# Patient Record
Sex: FEMALE | Race: WHITE | NOT HISPANIC OR LATINO | Employment: OTHER | URBAN - METROPOLITAN AREA
[De-identification: names, ages, dates, MRNs, and addresses within clinical notes are randomized per-mention and may not be internally consistent; named-entity substitution may affect disease eponyms.]

---

## 2017-01-26 RX ORDER — FUROSEMIDE 40 MG/1
TABLET ORAL
Qty: 135 TABLET | Refills: 0 | Status: SHIPPED | OUTPATIENT
Start: 2017-01-26 | End: 2017-04-24 | Stop reason: SDUPTHER

## 2017-03-09 RX ORDER — OMEPRAZOLE 20 MG/1
CAPSULE, DELAYED RELEASE ORAL
Qty: 90 CAPSULE | Refills: 0 | Status: SHIPPED | OUTPATIENT
Start: 2017-03-09 | End: 2017-06-07 | Stop reason: SDUPTHER

## 2017-03-29 ENCOUNTER — OFFICE VISIT (OUTPATIENT)
Dept: INTERNAL MEDICINE | Facility: CLINIC | Age: 82
End: 2017-03-29

## 2017-03-29 VITALS
DIASTOLIC BLOOD PRESSURE: 78 MMHG | SYSTOLIC BLOOD PRESSURE: 138 MMHG | WEIGHT: 136 LBS | HEIGHT: 60 IN | BODY MASS INDEX: 26.7 KG/M2 | TEMPERATURE: 98.3 F | OXYGEN SATURATION: 93 % | HEART RATE: 72 BPM

## 2017-03-29 DIAGNOSIS — R29.818 ABNORMAL ROMBERG TEST: ICD-10-CM

## 2017-03-29 DIAGNOSIS — K21.9 GASTROESOPHAGEAL REFLUX DISEASE WITHOUT ESOPHAGITIS: ICD-10-CM

## 2017-03-29 DIAGNOSIS — E78.49 OTHER HYPERLIPIDEMIA: ICD-10-CM

## 2017-03-29 DIAGNOSIS — F51.01 PRIMARY INSOMNIA: ICD-10-CM

## 2017-03-29 DIAGNOSIS — I27.20 PULMONARY HYPERTENSION (HCC): ICD-10-CM

## 2017-03-29 DIAGNOSIS — Z00.00 MEDICARE ANNUAL WELLNESS VISIT, INITIAL: Primary | ICD-10-CM

## 2017-03-29 DIAGNOSIS — N18.30 CHRONIC KIDNEY DISEASE, STAGE III (MODERATE) (HCC): ICD-10-CM

## 2017-03-29 DIAGNOSIS — M19.041 PRIMARY OSTEOARTHRITIS OF RIGHT HAND: ICD-10-CM

## 2017-03-29 DIAGNOSIS — D64.9 ANEMIA, UNSPECIFIED TYPE: ICD-10-CM

## 2017-03-29 DIAGNOSIS — I10 ESSENTIAL HYPERTENSION: ICD-10-CM

## 2017-03-29 DIAGNOSIS — E03.8 OTHER SPECIFIED HYPOTHYROIDISM: ICD-10-CM

## 2017-03-29 PROCEDURE — G0438 PPPS, INITIAL VISIT: HCPCS | Performed by: INTERNAL MEDICINE

## 2017-03-29 PROCEDURE — 99214 OFFICE O/P EST MOD 30 MIN: CPT | Performed by: INTERNAL MEDICINE

## 2017-03-29 PROCEDURE — G0009 ADMIN PNEUMOCOCCAL VACCINE: HCPCS | Performed by: INTERNAL MEDICINE

## 2017-03-29 PROCEDURE — 90670 PCV13 VACCINE IM: CPT | Performed by: INTERNAL MEDICINE

## 2017-03-29 NOTE — PROGRESS NOTES
QUICK REFERENCE INFORMATION:  The ABCs of the Annual Wellness Visit    Initial Medicare Wellness Visit    HEALTH RISK ASSESSMENT    6/17/1926    Recent Hospitalizations:  Recently treated at the following:  Other: [facility]        Current Medical Providers:  Patient Care Team:  Gurpreet Maya MD as PCP - General  Gurpreet Maya MD as PCP - Family Medicine        Smoking Status:  History   Smoking Status   • Former Smoker   Smokeless Tobacco   • Not on file       Alcohol Consumption:  History   Alcohol Use No       Depression Screen:   PHQ-9 Depression Screening 3/29/2017   Little interest or pleasure in doing things 0   Feeling down, depressed, or hopeless 3   Trouble falling or staying asleep, or sleeping too much 0   Feeling tired or having little energy 3   Poor appetite or overeating 0   Feeling bad about yourself - or that you are a failure or have let yourself or your family down 0   Trouble concentrating on things, such as reading the newspaper or watching television 0   Moving or speaking so slowly that other people could have noticed. Or the opposite - being so fidgety or restless that you have been moving around a lot more than usual 0   Thoughts that you would be better off dead, or of hurting yourself in some way 0   PHQ-9 Total Score 6   If you checked off any problems, how difficult have these problems made it for you to do your work, take care of things at home, or get along with other people? Not difficult at all       Health Habits and Functional and Cognitive Screening:  Functional & Cognitive Status 3/29/2017   Do you have difficulty preparing food and eating? No   Do you have difficulty bathing yourself? No   Do you have difficulty getting dressed? No   Do you have difficulty using the toilet? No   Do you have difficulty moving around from place to place? No   In the past year have you fallen or experienced a near fall? Yes   Do you need help using the phone?  No   Are you deaf or  do you have serious difficulty hearing?  No   Do you need help with transportation? No   Do you need help shopping? No   Do you need help preparing meals?  No   Do you need help with housework?  No   Do you need help with laundry? No   Do you need help taking your medications? No   Do you need help managing money? No       Health Habits  Current Diet: Well Balanced Diet  Dental Exam: Up to date  Eye Exam: Up to date  Exercise (times per week): 0 times per week  Current Exercise Activities Include: None          Does the patient have evidence of cognitive impairment? No    Asprin use counseling:yes      Recent Lab Results:    Visual Acuity:  No exam data present    Age-appropriate Screening Schedule:  Refer to the list below for future screening recommendations based on patient's age, sex and/or medical conditions. Orders for these recommended tests are listed in the plan section. The patient has been provided with a written plan.    Health Maintenance   Topic Date Due   • TDAP/TD VACCINES (1 - Tdap) 06/17/1945   • MAMMOGRAM  03/28/2016   • LIPID PANEL  09/28/2016   • PNEUMOCOCCAL VACCINES (65+ LOW/MEDIUM RISK) (2 of 2 - PPSV23) 10/26/2016   • INFLUENZA VACCINE  Completed   • ZOSTER VACCINE  Addressed        Subjective   History of Present Illness    Nehemias Lizama is a 89 y.o. female who presents for an Annual Wellness Visit.    The following portions of the patient's history were reviewed and updated as appropriate: allergies, current medications, past family history, past medical history, past social history, past surgical history and problem list.    Outpatient Medications Prior to Visit   Medication Sig Dispense Refill   • amiodarone (PACERONE) 200 MG tablet Take  by mouth.     • Artificial Saliva (MOUTH KOTE MT) Mouth Kote Mouth/Throat Solution; Patient Sig: Mouth Kote Mouth/Throat Solution USE AS DIRECTED AS A SALIVA SUB[facility]; 12; 0; 01-May-2013; Active     • bosentan (TRACLEER) 125 MG tablet Take  by mouth  2 (two) times a day.     • digoxin (LANOXIN) 125 MCG tablet TAKE 1 TABLET DAILY 90 tablet 1   • furosemide (LASIX) 40 MG tablet TAKE ONE AND ONE-HALF TABLETS DAILY 135 tablet 0   • hydrALAZINE (APRESOLINE) 50 MG tablet TAKE 1 TABLET THREE TIMES A  tablet 1   • HYDROcodone-acetaminophen (VICODIN) 5-500 MG per tablet Take  by mouth.     • Iloprost 20 MCG/ML solution Earnest MATTHEWS; Patient Sig: Earnest MATTHEWS ; 0; 04-Sep-2013; Active     • levothyroxine (SYNTHROID, LEVOTHROID) 88 MCG tablet TAKE 1 TABLET DAILY 90 tablet 1   • mirtazapine (REMERON) 30 MG tablet Take  by mouth.     • montelukast (SINGULAIR) 10 MG tablet Take  by mouth daily.     • omeprazole (priLOSEC) 20 MG capsule TAKE 1 CAPSULE DAILY 90 capsule 0   • PARoxetine (PAXIL) 40 MG tablet TAKE 1 TABLET DAILY 90 tablet 1   • potassium chloride (K-DUR,KLOR-CON) 20 MEQ CR tablet TAKE 1 TABLET DAILY 90 tablet 1   • quinapril (ACCUPRIL) 10 MG tablet Take  by mouth.       No facility-administered medications prior to visit.        Patient Active Problem List   Diagnosis   • Abdominal bloating   • Left lower quadrant pain   • Left upper quadrant pain   • Allergic rhinitis   • Anemia   • Ankle joint pain   • Atrial fibrillation   • Chronic kidney disease, stage III (moderate)   • Congestive heart failure   • Carpal tunnel syndrome   • Depression   • Diarrhea   • Diverticulosis of large intestine   • Gastroesophageal reflux disease   • Essential hypertension   • Excessive flatus   • Fatigue   • Abdominal wall hernia   • Hyperlipidemia   • Hypertension   • Hypokalemia   • Hypothyroidism   • Intestinal obstruction   • Impacted cerumen   • Insomnia   • Iron deficiency   • Pain in extremity   • Swelling of limb   • Osteoarthritis of hand   • Osteoarthritis of knee   • Pulmonary hypertension   • Urinary tract infection   • Vitamin D deficiency   • Aptyalism   • Left foot pain       Advanced Care Planning:  has an advanced directive - a copy has been provided and is  "in file    Identification of Risk Factors:  Risk factors include: increased fall risk.    Review of Systems    Compared to one year ago, the patient feels her physical health is the same.  Compared to one year ago, the patient feels her mental health is the same.    Objective     Physical Exam    Vitals:    03/29/17 1210   BP: (!) 186/55   BP Location: Left arm   Patient Position: Sitting   Cuff Size: Adult   Pulse: 72   Temp: 98.3 °F (36.8 °C)   TempSrc: Tympanic   SpO2: 93%   Weight: 136 lb (61.7 kg)   Height: 60\" (152.4 cm)   PainSc:   6       Body mass index is 26.56 kg/(m^2).  Discussed the patient's BMI with her. The BMI is in the acceptable range.    Assessment/Plan   Patient Self-Management and Personalized Health Advice  The patient has been provided with information about: fall prevention and preventive services including:   · Fall Risk assessment done, Fall Risk plan of care done.    Visit Diagnoses:  No diagnosis found.    No orders of the defined types were placed in this encounter.      Outpatient Encounter Prescriptions as of 3/29/2017   Medication Sig Dispense Refill   • amiodarone (PACERONE) 200 MG tablet Take  by mouth.     • Artificial Saliva (MOUTH KOTE MT) Mouth Kote Mouth/Throat Solution; Patient Sig: Mouth Kote Mouth/Throat Solution USE AS DIRECTED AS A [facility]; 12; 0; 01-May-2013; Active     • bosentan (TRACLEER) 125 MG tablet Take  by mouth 2 (two) times a day.     • digoxin (LANOXIN) 125 MCG tablet TAKE 1 TABLET DAILY 90 tablet 1   • furosemide (LASIX) 40 MG tablet TAKE ONE AND ONE-HALF TABLETS DAILY 135 tablet 0   • hydrALAZINE (APRESOLINE) 50 MG tablet TAKE 1 TABLET THREE TIMES A  tablet 1   • HYDROcodone-acetaminophen (VICODIN) 5-500 MG per tablet Take  by mouth.     • Iloprost 20 MCG/ML solution Ventavis SOLN; Patient Sig: Ventavis SOLN ; 0; 04-Sep-2013; Active     • levothyroxine (SYNTHROID, LEVOTHROID) 88 MCG tablet TAKE 1 TABLET DAILY 90 tablet 1   • mirtazapine " (REMERON) 30 MG tablet Take  by mouth.     • montelukast (SINGULAIR) 10 MG tablet Take  by mouth daily.     • omeprazole (priLOSEC) 20 MG capsule TAKE 1 CAPSULE DAILY 90 capsule 0   • PARoxetine (PAXIL) 40 MG tablet TAKE 1 TABLET DAILY 90 tablet 1   • potassium chloride (K-DUR,KLOR-CON) 20 MEQ CR tablet TAKE 1 TABLET DAILY 90 tablet 1   • quinapril (ACCUPRIL) 10 MG tablet Take  by mouth.       No facility-administered encounter medications on file as of 3/29/2017.        Reviewed use of high risk medication in the elderly: yes  Reviewed for potential of harmful drug interactions in the elderly: yes    Follow Up:  No Follow-up on file.     An After Visit Summary and PPPS with all of these plans were given to the patient.

## 2017-03-29 NOTE — PATIENT INSTRUCTIONS
Medicare Wellness  Personal Prevention Plan of Service     Date of Office Visit:  2017  Encounter Provider:  Gurpreet Maya MD  Place of Service:  [facility] INTER[facility] MED[facility]  Patient Name: Nehemias Lizama  :  1926    As part of the Medicare Wellness portion of your visit today, we are providing you with this personalized preventive plan of services (PPPS). This plan is based upon recommendations of the [place] Preventive Services Task Force (USPSTF) and the Advisory Committee on Immunization Practices (ACIP).    This lists the preventive care services that should be considered, and provides dates of when you are due. Items listed as completed are up-to-date and do not require any further intervention.    Health Maintenance   Topic Date Due   • TDAP/TD VACCINES (1 - Tdap) 1945   • MEDICARE ANNUAL WELLNESS  2016   • MAMMOGRAM  2016   • LIPID PANEL  2016   • PNEUMOCOCCAL VACCINES (65+ LOW/MEDIUM RISK) (2 of 2 - PPSV23) 10/26/2016   • INFLUENZA VACCINE  Completed   • ZOSTER VACCINE  Addressed       No orders of the defined types were placed in this encounter.      Return in about 6 months (around 2017).

## 2017-03-30 NOTE — PROGRESS NOTES
Subjective   Nehemias Lizama is a 89 y.o. female.   She is here today for Medicare annual wellness visit initial along with hyper lipidemia hypertension pulmonary hypertension GERD without esophagitis hypothyroid as an chronic kidney disease stage III osteoarthritis of right hand anemia insomnia and abnormal Romberg test  History of Present Illness   She is here today for Medicare annual wellness visit initial along with hyper lipidemia hypertension pulmonary hypertension GERD without esophagitis hypothyroidism chronic kidney disease stage III osteoarthritis of right hand anemia insomnia and abnormal Romberg test  The following portions of the patient's history were reviewed and updated as appropriate: allergies, current medications, past family history, past medical history, past social history, past surgical history and problem list.    Review of Systems   All other systems reviewed and are negative.      Objective   Physical Exam   Constitutional: She is oriented to person, place, and time. Vital signs are normal. She appears well-developed and well-nourished. She is active.   HENT:   Head: Normocephalic and atraumatic.   Right Ear: Hearing, tympanic membrane, external ear and ear canal normal.   Left Ear: Hearing, tympanic membrane, external ear and ear canal normal.   Nose: Nose normal.   Mouth/Throat: Uvula is midline, oropharynx is clear and moist and mucous membranes are normal.   Eyes: Conjunctivae, EOM and lids are normal. Pupils are equal, round, and reactive to light. Right eye exhibits no discharge. Left eye exhibits no discharge.   Neck: Trachea normal, normal range of motion, full passive range of motion without pain and phonation normal. Neck supple. Carotid bruit is not present. No edema present. No thyroid mass and no thyromegaly present.   Cardiovascular: Normal rate, regular rhythm, normal heart sounds, intact distal pulses and normal pulses.  Exam reveals no gallop and no friction rub.    No  murmur heard.  Pulmonary/Chest: Effort normal and breath sounds normal. No respiratory distress. She has no wheezes. She has no rales.   Abdominal: Soft. Normal appearance, normal aorta and bowel sounds are normal. She exhibits no distension, no abdominal bruit and no mass. There is no hepatosplenomegaly. There is no tenderness. There is no rebound, no guarding and no CVA tenderness. No hernia. Hernia confirmed negative in the right inguinal area and confirmed negative in the left inguinal area.   Musculoskeletal: Normal range of motion. She exhibits no edema or tenderness.       Vascular Status -  Her exam exhibits right foot vasculature normal. Her exam exhibits no right foot edema. Her exam exhibits left foot vasculature normal. Her exam exhibits no left foot edema.   Skin Integrity  -  Her right foot skin is intact.     Voleen 's left foot skin is intact. .  Lymphadenopathy:     She has no cervical adenopathy.     She has no axillary adenopathy.        Right: No inguinal and no supraclavicular adenopathy present.        Left: No inguinal and no supraclavicular adenopathy present.   Neurological: She is alert and oriented to person, place, and time. She has normal strength. No cranial nerve deficit or sensory deficit. She exhibits normal muscle tone. Coordination and gait normal.   Normal gait yet positive Romberg test   Skin: Skin is warm, dry and intact. No cyanosis. Nails show no clubbing.   Psychiatric: She has a normal mood and affect. Her speech is normal and behavior is normal. Judgment and thought content normal. Cognition and memory are normal.   Nursing note and vitals reviewed.      Assessment/Plan   Diagnoses and all orders for this visit:    Medicare annual wellness visit, initial  -     Cancel: Lipid Panel With LDL / HDL Ratio  -     Cancel: T4, Free  -     Cancel: TSH    Other hyperlipidemia  -     Cancel: Lipid Panel With LDL / HDL Ratio  -     Cancel: T4, Free  -     Cancel: TSH    Essential  hypertension  -     Cancel: Lipid Panel With LDL / HDL Ratio  -     Cancel: T4, Free  -     Cancel: TSH    Pulmonary hypertension  -     Cancel: Lipid Panel With LDL / HDL Ratio  -     Cancel: T4, Free  -     Cancel: TSH    Gastroesophageal reflux disease without esophagitis  -     Cancel: Lipid Panel With LDL / HDL Ratio  -     Cancel: T4, Free  -     Cancel: TSH    Other specified hypothyroidism  -     Cancel: Lipid Panel With LDL / HDL Ratio  -     Cancel: T4, Free  -     Cancel: TSH    Chronic kidney disease, stage III (moderate)  -     Cancel: Lipid Panel With LDL / HDL Ratio  -     Cancel: T4, Free  -     Cancel: TSH    Primary osteoarthritis of right hand  -     Cancel: Lipid Panel With LDL / HDL Ratio  -     Cancel: T4, Free  -     Cancel: TSH    Anemia, unspecified type  -     Cancel: Lipid Panel With LDL / HDL Ratio  -     Cancel: T4, Free  -     Cancel: TSH    Primary insomnia  -     Cancel: Lipid Panel With LDL / HDL Ratio  -     Cancel: T4, Free  -     Cancel: TSH    Abnormal Romberg test    Other orders  -     Pneumococcal Conjugate Vaccine 13-Valent All      Medicare annual wellness visit initial following recommendations  Chronic kidney disease stage III follow closely  Osteoporosis of right hand no easy answers  Anemia follow labs  Insomnia supportive meds proper sleep hygiene  Abnormal Romberg test no easy answers at her age  Hypothyroid as a follow TSH free T4  GERD stable on current medication  Hypertension well controlled  Pulmonary hypertension well-controlled  Hyper lipidemia keep LDL less than 70 with proper diet exercise medication

## 2017-04-13 RX ORDER — POTASSIUM CHLORIDE 20 MEQ/1
TABLET, EXTENDED RELEASE ORAL
Qty: 90 TABLET | Refills: 0 | Status: SHIPPED | OUTPATIENT
Start: 2017-04-13 | End: 2017-07-12 | Stop reason: SDUPTHER

## 2017-04-13 RX ORDER — DIGOXIN 125 MCG
TABLET ORAL
Qty: 90 TABLET | Refills: 0 | OUTPATIENT
Start: 2017-04-13 | End: 2017-06-17

## 2017-04-24 RX ORDER — PAROXETINE HYDROCHLORIDE 40 MG/1
TABLET, FILM COATED ORAL
Qty: 90 TABLET | Refills: 0 | Status: SHIPPED | OUTPATIENT
Start: 2017-04-24 | End: 2017-07-23 | Stop reason: SDUPTHER

## 2017-04-24 RX ORDER — FUROSEMIDE 40 MG/1
TABLET ORAL
Qty: 135 TABLET | Refills: 1 | Status: SHIPPED | OUTPATIENT
Start: 2017-04-24 | End: 2017-07-05 | Stop reason: HOSPADM

## 2017-05-30 ENCOUNTER — APPOINTMENT (OUTPATIENT)
Dept: GENERAL RADIOLOGY | Facility: HOSPITAL | Age: 82
End: 2017-05-30

## 2017-05-30 ENCOUNTER — HOSPITAL ENCOUNTER (EMERGENCY)
Facility: HOSPITAL | Age: 82
Discharge: HOME | End: 2017-05-31
Attending: EMERGENCY MEDICINE | Admitting: EMERGENCY MEDICINE

## 2017-05-30 DIAGNOSIS — N18.30 CHRONIC KIDNEY DISEASE, STAGE III (MODERATE) (HCC): ICD-10-CM

## 2017-05-30 DIAGNOSIS — Z95.0 PACEMAKER: ICD-10-CM

## 2017-05-30 DIAGNOSIS — T46.0X1A: ICD-10-CM

## 2017-05-30 DIAGNOSIS — I50.9 ACUTE ON CHRONIC CONGESTIVE HEART FAILURE, UNSPECIFIED CONGESTIVE HEART FAILURE TYPE: Primary | ICD-10-CM

## 2017-05-30 LAB
ALBUMIN SERPL-MCNC: 3.9 G/DL (ref 3.5–5.2)
ALBUMIN/GLOB SERPL: 1.3 G/DL
ALP SERPL-CCNC: 81 U/L (ref 39–117)
ALT SERPL W P-5'-P-CCNC: 19 U/L (ref 1–33)
ANION GAP SERPL CALCULATED.3IONS-SCNC: 14.7 MMOL/L
AST SERPL-CCNC: 14 U/L (ref 1–32)
BASOPHILS # BLD AUTO: 0.03 10*3/MM3 (ref 0–0.2)
BASOPHILS NFR BLD AUTO: 0.2 % (ref 0–1.5)
BILIRUB SERPL-MCNC: 0.4 MG/DL (ref 0.1–1.2)
BUN BLD-MCNC: 31 MG/DL (ref 8–23)
BUN/CREAT SERPL: 17.2 (ref 7–25)
CALCIUM SPEC-SCNC: 9.9 MG/DL (ref 8.2–9.6)
CHLORIDE SERPL-SCNC: 103 MMOL/L (ref 98–107)
CO2 SERPL-SCNC: 23.3 MMOL/L (ref 22–29)
CREAT BLD-MCNC: 1.8 MG/DL (ref 0.57–1)
DEPRECATED RDW RBC AUTO: 53.9 FL (ref 37–54)
EOSINOPHIL # BLD AUTO: 0.07 10*3/MM3 (ref 0–0.7)
EOSINOPHIL NFR BLD AUTO: 0.6 % (ref 0.3–6.2)
ERYTHROCYTE [DISTWIDTH] IN BLOOD BY AUTOMATED COUNT: 15.5 % (ref 11.7–13)
GFR SERPL CREATININE-BSD FRML MDRD: 26 ML/MIN/1.73
GLOBULIN UR ELPH-MCNC: 3.1 GM/DL
GLUCOSE BLD-MCNC: 171 MG/DL (ref 65–99)
HCT VFR BLD AUTO: 27 % (ref 35.6–45.5)
HGB BLD-MCNC: 8.6 G/DL (ref 11.9–15.5)
IMM GRANULOCYTES # BLD: 0.06 10*3/MM3 (ref 0–0.03)
IMM GRANULOCYTES NFR BLD: 0.5 % (ref 0–0.5)
LYMPHOCYTES # BLD AUTO: 1.06 10*3/MM3 (ref 0.9–4.8)
LYMPHOCYTES NFR BLD AUTO: 8.4 % (ref 19.6–45.3)
MCH RBC QN AUTO: 30.2 PG (ref 26.9–32)
MCHC RBC AUTO-ENTMCNC: 31.9 G/DL (ref 32.4–36.3)
MCV RBC AUTO: 94.7 FL (ref 80.5–98.2)
MONOCYTES # BLD AUTO: 0.92 10*3/MM3 (ref 0.2–1.2)
MONOCYTES NFR BLD AUTO: 7.3 % (ref 5–12)
NEUTROPHILS # BLD AUTO: 10.47 10*3/MM3 (ref 1.9–8.1)
NEUTROPHILS NFR BLD AUTO: 83 % (ref 42.7–76)
NT-PROBNP SERPL-MCNC: 1505 PG/ML (ref 0–1800)
PLATELET # BLD AUTO: 185 10*3/MM3 (ref 140–500)
PMV BLD AUTO: 10.6 FL (ref 6–12)
POTASSIUM BLD-SCNC: 4.3 MMOL/L (ref 3.5–5.2)
PROT SERPL-MCNC: 7 G/DL (ref 6–8.5)
RBC # BLD AUTO: 2.85 10*6/MM3 (ref 3.9–5.2)
SODIUM BLD-SCNC: 141 MMOL/L (ref 136–145)
TROPONIN T SERPL-MCNC: <0.01 NG/ML (ref 0–0.03)
WBC NRBC COR # BLD: 12.61 10*3/MM3 (ref 4.5–10.7)

## 2017-05-30 PROCEDURE — 80053 COMPREHEN METABOLIC PANEL: CPT | Performed by: EMERGENCY MEDICINE

## 2017-05-30 PROCEDURE — 93010 ELECTROCARDIOGRAM REPORT: CPT | Performed by: INTERNAL MEDICINE

## 2017-05-30 PROCEDURE — 84145 PROCALCITONIN (PCT): CPT | Performed by: EMERGENCY MEDICINE

## 2017-05-30 PROCEDURE — 93005 ELECTROCARDIOGRAM TRACING: CPT

## 2017-05-30 PROCEDURE — 71020 HC CHEST PA AND LATERAL: CPT

## 2017-05-30 PROCEDURE — 83880 ASSAY OF NATRIURETIC PEPTIDE: CPT | Performed by: EMERGENCY MEDICINE

## 2017-05-30 PROCEDURE — 84484 ASSAY OF TROPONIN QUANT: CPT | Performed by: EMERGENCY MEDICINE

## 2017-05-30 PROCEDURE — 99284 EMERGENCY DEPT VISIT MOD MDM: CPT

## 2017-05-30 PROCEDURE — 85025 COMPLETE CBC W/AUTO DIFF WBC: CPT | Performed by: EMERGENCY MEDICINE

## 2017-05-30 PROCEDURE — 80162 ASSAY OF DIGOXIN TOTAL: CPT | Performed by: EMERGENCY MEDICINE

## 2017-05-30 RX ORDER — ASPIRIN 81 MG/1
81 TABLET, CHEWABLE ORAL DAILY
COMMUNITY

## 2017-05-30 RX ORDER — SODIUM CHLORIDE 0.9 % (FLUSH) 0.9 %
10 SYRINGE (ML) INJECTION AS NEEDED
Status: DISCONTINUED | OUTPATIENT
Start: 2017-05-30 | End: 2017-05-31 | Stop reason: HOSPADM

## 2017-05-30 RX ORDER — FUROSEMIDE 10 MG/ML
20 INJECTION INTRAMUSCULAR; INTRAVENOUS ONCE
Status: COMPLETED | OUTPATIENT
Start: 2017-05-30 | End: 2017-05-31

## 2017-05-31 VITALS
TEMPERATURE: 98.2 F | WEIGHT: 135 LBS | OXYGEN SATURATION: 94 % | HEART RATE: 64 BPM | DIASTOLIC BLOOD PRESSURE: 72 MMHG | BODY MASS INDEX: 25.49 KG/M2 | HEIGHT: 61 IN | RESPIRATION RATE: 18 BRPM | SYSTOLIC BLOOD PRESSURE: 159 MMHG

## 2017-05-31 LAB
DIGOXIN SERPL-MCNC: 1.7 NG/ML (ref 0.6–1.2)
HOLD SPECIMEN: NORMAL
HOLD SPECIMEN: NORMAL
PROCALCITONIN SERPL-MCNC: 0.07 NG/ML (ref 0.1–0.25)
WHOLE BLOOD HOLD SPECIMEN: NORMAL
WHOLE BLOOD HOLD SPECIMEN: NORMAL

## 2017-05-31 PROCEDURE — 96374 THER/PROPH/DIAG INJ IV PUSH: CPT

## 2017-05-31 PROCEDURE — 25010000002 FUROSEMIDE PER 20 MG: Performed by: EMERGENCY MEDICINE

## 2017-05-31 RX ADMIN — FUROSEMIDE 20 MG: 10 INJECTION, SOLUTION INTRAMUSCULAR; INTRAVENOUS at 00:29

## 2017-06-06 ENCOUNTER — TELEPHONE (OUTPATIENT)
Dept: SOCIAL WORK | Facility: HOSPITAL | Age: 82
End: 2017-06-06

## 2017-06-06 ENCOUNTER — OFFICE VISIT (OUTPATIENT)
Dept: INTERNAL MEDICINE | Facility: CLINIC | Age: 82
End: 2017-06-06

## 2017-06-06 VITALS
SYSTOLIC BLOOD PRESSURE: 206 MMHG | BODY MASS INDEX: 25.11 KG/M2 | DIASTOLIC BLOOD PRESSURE: 62 MMHG | HEART RATE: 60 BPM | HEIGHT: 61 IN | WEIGHT: 133 LBS | TEMPERATURE: 98.4 F | OXYGEN SATURATION: 94 % | RESPIRATION RATE: 16 BRPM

## 2017-06-06 DIAGNOSIS — I10 ESSENTIAL HYPERTENSION: Primary | ICD-10-CM

## 2017-06-06 DIAGNOSIS — R06.09 DOE (DYSPNEA ON EXERTION): ICD-10-CM

## 2017-06-06 DIAGNOSIS — N18.30 CHRONIC KIDNEY DISEASE, STAGE III (MODERATE) (HCC): ICD-10-CM

## 2017-06-06 DIAGNOSIS — I50.40 COMBINED SYSTOLIC AND DIASTOLIC CONGESTIVE HEART FAILURE, UNSPECIFIED CONGESTIVE HEART FAILURE CHRONICITY: ICD-10-CM

## 2017-06-06 DIAGNOSIS — I27.20 PULMONARY HYPERTENSION (HCC): ICD-10-CM

## 2017-06-06 PROCEDURE — 99214 OFFICE O/P EST MOD 30 MIN: CPT | Performed by: INTERNAL MEDICINE

## 2017-06-06 NOTE — TELEPHONE ENCOUNTER
Spoke with pt today in f/u and she states that she did not get into see her cardiologist but she did speak with him on the phone and he cut her digoxin back to every other day and that she is feeling much better. She has an appointment to see Dr. Maya today. No other questions at this time. Margaret PLATA

## 2017-06-07 RX ORDER — OMEPRAZOLE 20 MG/1
CAPSULE, DELAYED RELEASE ORAL
Qty: 90 CAPSULE | Refills: 1 | Status: SHIPPED | OUTPATIENT
Start: 2017-06-07 | End: 2017-12-04 | Stop reason: SDUPTHER

## 2017-06-12 RX ORDER — HYDRALAZINE HYDROCHLORIDE 50 MG/1
TABLET, FILM COATED ORAL
Qty: 270 TABLET | Refills: 0 | Status: SHIPPED | OUTPATIENT
Start: 2017-06-12 | End: 2017-09-10 | Stop reason: SDUPTHER

## 2017-06-12 RX ORDER — LEVOTHYROXINE SODIUM 88 UG/1
TABLET ORAL
Qty: 90 TABLET | Refills: 0 | Status: SHIPPED | OUTPATIENT
Start: 2017-06-12 | End: 2017-09-10 | Stop reason: SDUPTHER

## 2017-06-15 ENCOUNTER — OFFICE VISIT (OUTPATIENT)
Dept: INTERNAL MEDICINE | Facility: CLINIC | Age: 82
End: 2017-06-15

## 2017-06-15 VITALS
HEART RATE: 60 BPM | OXYGEN SATURATION: 94 % | HEIGHT: 61 IN | BODY MASS INDEX: 25.3 KG/M2 | RESPIRATION RATE: 16 BRPM | WEIGHT: 134 LBS | TEMPERATURE: 98.4 F | SYSTOLIC BLOOD PRESSURE: 188 MMHG | DIASTOLIC BLOOD PRESSURE: 50 MMHG
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
## 2017-06-15 DIAGNOSIS — S09.8XXA BLUNT HEAD TRAUMA, INITIAL ENCOUNTER: ICD-10-CM
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
## 2017-06-15 DIAGNOSIS — I10 ESSENTIAL HYPERTENSION: ICD-10-CM

## 2017-06-15 PROCEDURE — 99213 OFFICE O/P EST LOW 20 MIN: CPT | Performed by: INTERNAL MEDICINE

## 2017-06-15 NOTE — PROGRESS NOTES
Subjective   Nehemias Lizama is a 89 y.o. female.   She is here today status post fall at home 6/11/17 and did not go to the ER and had blunt head trauma as well  History of Present Illness   She is here today for status post fall at home on 6/11/17 and did not go to the ER and had blunt head trauma as well and is also here for hypertension  The following portions of the patient's history were reviewed and updated as appropriate: allergies, current medications, past family history, past medical history, past social history, past surgical history and problem list.    Review of Systems   All other systems reviewed and are negative.      Objective   Physical Exam   Constitutional: She is oriented to person, place, and time. She appears well-developed and well-nourished. She is cooperative.   HENT:   Head: Normocephalic and atraumatic.   Right Ear: Hearing, tympanic membrane, external ear and ear canal normal.   Left Ear: Hearing, tympanic membrane, external ear and ear canal normal.   Nose: Nose normal.   Mouth/Throat: Uvula is midline, oropharynx is clear and moist and mucous membranes are normal.   Eyes: Conjunctivae, EOM and lids are normal. Pupils are equal, round, and reactive to light.   Neck: Phonation normal. Neck supple. Carotid bruit is not present.   Cardiovascular: Normal rate, regular rhythm and normal heart sounds.  Exam reveals no gallop and no friction rub.    No murmur heard.  Pulmonary/Chest: Effort normal and breath sounds normal. No respiratory distress.   Abdominal: Soft. Bowel sounds are normal. She exhibits no distension and no mass. There is no hepatosplenomegaly. There is no tenderness. There is no rebound and no guarding. No hernia.   Musculoskeletal: She exhibits no edema.   Neurological: She is alert and oriented to person, place, and time. Coordination and gait normal.   Skin: Skin is warm and dry.   Psychiatric: She has a normal mood and affect. Her speech is normal and behavior is normal.  Judgment and thought content normal.   Nursing note and vitals reviewed.      Assessment/Plan   Diagnoses and all orders for this visit:    Fall at home, initial encounter  -     CT Head Without Contrast    Blunt head trauma, initial encounter  -     CT Head Without Contrast    Essential hypertension      Fell at home 6/11/17      Follow-up home initial encounter from 6/11/17 we will get CT of head without contrast  Blunt head trauma initial encounter CT of head without contrast  Hypertension not well-controlled we will make adjustments to medication as needed

## 2017-06-16 ENCOUNTER — HOSPITAL ENCOUNTER (OUTPATIENT)
Dept: CT IMAGING | Facility: HOSPITAL | Age: 82
Discharge: HOME | End: 2017-06-16
Attending: INTERNAL MEDICINE | Admitting: INTERNAL MEDICINE

## 2017-06-16 PROCEDURE — 70450 CT HEAD/BRAIN W/O DYE: CPT

## 2017-06-20 ENCOUNTER — HOSPITAL ENCOUNTER (OUTPATIENT)
Dept: CARDIOLOGY | Facility: HOSPITAL | Age: 82
Discharge: HOME | End: 2017-06-20
Attending: INTERNAL MEDICINE | Admitting: INTERNAL MEDICINE

## 2017-06-20 VITALS — WEIGHT: 134 LBS | BODY MASS INDEX: 25.3 KG/M2 | HEIGHT: 61 IN

## 2017-06-20 LAB
BH CV ECHO MEAS - ACS: 1.6 CM
BH CV ECHO MEAS - AO MAX PG (FULL): 8 MMHG
BH CV ECHO MEAS - AO MAX PG: 14.3 MMHG
BH CV ECHO MEAS - AO MEAN PG (FULL): 5 MMHG
BH CV ECHO MEAS - AO MEAN PG: 8 MMHG
BH CV ECHO MEAS - AO ROOT AREA (BSA CORRECTED): 1.6
BH CV ECHO MEAS - AO ROOT AREA: 4.9 CM^2
BH CV ECHO MEAS - AO ROOT DIAM: 2.5 CM
BH CV ECHO MEAS - AO V2 MAX: 189 CM/SEC
BH CV ECHO MEAS - AO V2 MEAN: 125 CM/SEC
BH CV ECHO MEAS - AO V2 VTI: 37.4 CM
BH CV ECHO MEAS - AVA(I,A): 2.4 CM^2
BH CV ECHO MEAS - AVA(I,D): 2.4 CM^2
BH CV ECHO MEAS - AVA(V,A): 2.1 CM^2
BH CV ECHO MEAS - AVA(V,D): 2.1 CM^2
BH CV ECHO MEAS - BSA(HAYCOCK): 1.6 M^2
BH CV ECHO MEAS - BSA: 1.6 M^2
BH CV ECHO MEAS - BZI_BMI: 25.3 KILOGRAMS/M^2
BH CV ECHO MEAS - BZI_METRIC_HEIGHT: 154.9 CM
BH CV ECHO MEAS - BZI_METRIC_WEIGHT: 60.8 KG
BH CV ECHO MEAS - CONTRAST EF 4CH: 66.2 ML/M^2
BH CV ECHO MEAS - EDV(CUBED): 46.7 ML
BH CV ECHO MEAS - EDV(MOD-SP4): 65 ML
BH CV ECHO MEAS - EDV(TEICH): 54.4 ML
BH CV ECHO MEAS - EF(CUBED): 57.8 %
BH CV ECHO MEAS - EF(MOD-SP4): 66.2 %
BH CV ECHO MEAS - EF(TEICH): 50.4 %
BH CV ECHO MEAS - ESV(CUBED): 19.7 ML
BH CV ECHO MEAS - ESV(MOD-SP4): 22 ML
BH CV ECHO MEAS - ESV(TEICH): 27 ML
BH CV ECHO MEAS - FS: 25 %
BH CV ECHO MEAS - IVS/LVPW: 0.85
BH CV ECHO MEAS - IVSD: 1.1 CM
BH CV ECHO MEAS - LA DIMENSION: 4.3 CM
BH CV ECHO MEAS - LA/AO: 1.7
BH CV ECHO MEAS - LAT PEAK E' VEL: 8.2 CM/SEC
BH CV ECHO MEAS - LV DIASTOLIC VOL/BSA (35-75): 40.8 ML/M^2
BH CV ECHO MEAS - LV MASS(C)D: 141.5 GRAMS
BH CV ECHO MEAS - LV MASS(C)DI: 88.8 GRAMS/M^2
BH CV ECHO MEAS - LV MAX PG: 6.3 MMHG
BH CV ECHO MEAS - LV MEAN PG: 3 MMHG
BH CV ECHO MEAS - LV SYSTOLIC VOL/BSA (12-30): 13.8 ML/M^2
BH CV ECHO MEAS - LV V1 MAX: 125 CM/SEC
BH CV ECHO MEAS - LV V1 MEAN: 85.2 CM/SEC
BH CV ECHO MEAS - LV V1 VTI: 29.1 CM
BH CV ECHO MEAS - LVIDD: 3.6 CM
BH CV ECHO MEAS - LVIDS: 2.7 CM
BH CV ECHO MEAS - LVLD AP4: 5.2 CM
BH CV ECHO MEAS - LVLS AP4: 4.9 CM
BH CV ECHO MEAS - LVOT AREA (M): 3.1 CM^2
BH CV ECHO MEAS - LVOT AREA: 3.1 CM^2
BH CV ECHO MEAS - LVOT DIAM: 2 CM
BH CV ECHO MEAS - LVPWD: 1.3 CM
BH CV ECHO MEAS - MED PEAK E' VEL: 9.5 CM/SEC
BH CV ECHO MEAS - MR MAX PG: 145.4 MMHG
BH CV ECHO MEAS - MR MAX VEL: 603 CM/SEC
BH CV ECHO MEAS - MV A DUR: 0.18 SEC
BH CV ECHO MEAS - MV A MAX VEL: 34.4 CM/SEC
BH CV ECHO MEAS - MV DEC SLOPE: 625 CM/SEC^2
BH CV ECHO MEAS - MV DEC TIME: 0.2 SEC
BH CV ECHO MEAS - MV E MAX VEL: 146 CM/SEC
BH CV ECHO MEAS - MV E/A: 4.2
BH CV ECHO MEAS - MV MAX PG: 8.9 MMHG
BH CV ECHO MEAS - MV MEAN PG: 2 MMHG
BH CV ECHO MEAS - MV P1/2T MAX VEL: 160 CM/SEC
BH CV ECHO MEAS - MV P1/2T: 75 MSEC
BH CV ECHO MEAS - MV V2 MAX: 149 CM/SEC
BH CV ECHO MEAS - MV V2 MEAN: 61.9 CM/SEC
BH CV ECHO MEAS - MV V2 VTI: 36.6 CM
BH CV ECHO MEAS - MVA P1/2T LCG: 1.4 CM^2
BH CV ECHO MEAS - MVA(P1/2T): 2.9 CM^2
BH CV ECHO MEAS - MVA(VTI): 2.5 CM^2
BH CV ECHO MEAS - PA MAX PG (FULL): 9.4 MMHG
BH CV ECHO MEAS - PA MAX PG: 10.2 MMHG
BH CV ECHO MEAS - PA V2 MAX: 160 CM/SEC
BH CV ECHO MEAS - PI END-D VEL: 33.9 CM/SEC
BH CV ECHO MEAS - PVA(V,A): 1.1 CM^2
BH CV ECHO MEAS - PVA(V,D): 1.1 CM^2
BH CV ECHO MEAS - QP/QS: 0.4
BH CV ECHO MEAS - RAP SYSTOLE: 10 MMHG
BH CV ECHO MEAS - RV MAX PG: 0.84 MMHG
BH CV ECHO MEAS - RV MEAN PG: 0 MMHG
BH CV ECHO MEAS - RV V1 MAX: 45.8 CM/SEC
BH CV ECHO MEAS - RV V1 MEAN: 32.7 CM/SEC
BH CV ECHO MEAS - RV V1 VTI: 9.6 CM
BH CV ECHO MEAS - RVDD: 2.6 CM
BH CV ECHO MEAS - RVOT AREA: 3.8 CM^2
BH CV ECHO MEAS - RVOT DIAM: 2.2 CM
BH CV ECHO MEAS - RVSP: 80.9 MMHG
BH CV ECHO MEAS - SI(AO): 115.2 ML/M^2
BH CV ECHO MEAS - SI(CUBED): 16.9 ML/M^2
BH CV ECHO MEAS - SI(LVOT): 57.4 ML/M^2
BH CV ECHO MEAS - SI(MOD-SP4): 27 ML/M^2
BH CV ECHO MEAS - SI(TEICH): 17.2 ML/M^2
BH CV ECHO MEAS - SV(AO): 183.6 ML
BH CV ECHO MEAS - SV(CUBED): 27 ML
BH CV ECHO MEAS - SV(LVOT): 91.4 ML
BH CV ECHO MEAS - SV(MOD-SP4): 43 ML
BH CV ECHO MEAS - SV(RVOT): 36.5 ML
BH CV ECHO MEAS - SV(TEICH): 27.4 ML
BH CV ECHO MEAS - TAPSE (>1.6): 1.4 CM2
BH CV ECHO MEAS - TR MAX VEL: 421 CM/SEC
BH CV XLRA - RV BASE: 3.5 CM
BH CV XLRA - RV LENGTH: 6.2 CM
BH CV XLRA - RV MID: 3.7 CM
BH CV XLRA - TDI S': 13.2 CM/SEC
LEFT ATRIUM VOLUME INDEX: 56.9 ML/M2
LV EF 2D ECHO EST: 65 %

## 2017-06-20 PROCEDURE — 0399T HC MYOCARDL STRAIN IMAG QUAN ASSMT PER SESS: CPT

## 2017-06-20 PROCEDURE — 93306 TTE W/DOPPLER COMPLETE: CPT | Performed by: INTERNAL MEDICINE

## 2017-06-20 PROCEDURE — 93306 TTE W/DOPPLER COMPLETE: CPT

## 2017-06-21 NOTE — PROGRESS NOTES
Subjective   Nehemias Lizama is a 89 y.o. female.   She is here today for hospital discharge follow-up for combined systolic and diastolic congestive heart failure along with dyspnea on exertion pulmonary hypertension essential hypertension and chronic kidney disease stage III  History of Present Illness   She is here today for hospital discharge follow-up for combined systolic and diastolic congestive heart failure along with dyspnea on exertion pulmonary hypertension essential hypertension and chronic kidney disease stage III  The following portions of the patient's history were reviewed and updated as appropriate: allergies, current medications, past family history, past medical history, past social history, past surgical history and problem list.    Review of Systems   Constitutional: Positive for fatigue.   Respiratory: Positive for shortness of breath (with exertion).    All other systems reviewed and are negative.      Objective   Physical Exam   Constitutional: She is oriented to person, place, and time. She appears well-developed and well-nourished. She is cooperative.   HENT:   Head: Normocephalic and atraumatic.   Right Ear: Hearing, tympanic membrane, external ear and ear canal normal.   Left Ear: Hearing, tympanic membrane, external ear and ear canal normal.   Nose: Nose normal.   Mouth/Throat: Uvula is midline, oropharynx is clear and moist and mucous membranes are normal.   Eyes: Conjunctivae, EOM and lids are normal. Pupils are equal, round, and reactive to light.   Neck: Phonation normal. Neck supple. Carotid bruit is not present.   Cardiovascular: Normal rate, regular rhythm and normal heart sounds.  Exam reveals no gallop and no friction rub.    No murmur heard.  Pulmonary/Chest: Effort normal and breath sounds normal. No respiratory distress.   Abdominal: Soft. Bowel sounds are normal. She exhibits no distension and no mass. There is no hepatosplenomegaly. There is no tenderness. There is no  rebound and no guarding. No hernia.   Musculoskeletal: She exhibits no edema.   Neurological: She is alert and oriented to person, place, and time. Coordination and gait normal.   Skin: Skin is warm and dry.   Psychiatric: She has a normal mood and affect. Her speech is normal and behavior is normal. Judgment and thought content normal.   Nursing note and vitals reviewed.      Assessment/Plan   Diagnoses and all orders for this visit:    Essential hypertension    Pulmonary hypertension  -     Adult Transthoracic Echo Complete    LINDER (dyspnea on exertion)  -     Adult Transthoracic Echo Complete    Combined systolic and diastolic congestive heart failure, unspecified congestive heart failure chronicity    Chronic kidney disease, stage III (moderate)  -     Adult Transthoracic Echo Complete        Hypertension normally much better controlled and we will make adjustments to medication as needed  Hypertension 2-D echo of heart  Dyspnea on exertion the same  Combined systolic diastolic congestive heart failure follow-up for diabetics as needed  Chronic kidney disease stage III follow closely  The problem with her blood pressure is her systolic is very high but the diastolic is very normal for drop her systolic enough we may drop this diastolic where she will not get good coronary artery flow

## 2017-07-02 ENCOUNTER — APPOINTMENT (OUTPATIENT)
Dept: GENERAL RADIOLOGY | Facility: HOSPITAL | Age: 82
End: 2017-07-02

## 2017-07-02 ENCOUNTER — HOSPITAL ENCOUNTER (OUTPATIENT)
Facility: HOSPITAL | Age: 82
Setting detail: OBSERVATION
Discharge: HOME | End: 2017-07-05
Attending: EMERGENCY MEDICINE | Admitting: INTERNAL MEDICINE

## 2017-07-02 DIAGNOSIS — I10 ESSENTIAL HYPERTENSION: ICD-10-CM

## 2017-07-02 DIAGNOSIS — R09.02 HYPOXIA: ICD-10-CM

## 2017-07-02 DIAGNOSIS — N18.3 CKD (CHRONIC KIDNEY DISEASE), STAGE 3 (MODERATE): ICD-10-CM

## 2017-07-02 DIAGNOSIS — R26.2 DIFFICULTY WALKING: ICD-10-CM

## 2017-07-02 DIAGNOSIS — I50.33 ACUTE ON CHRONIC DIASTOLIC CONGESTIVE HEART FAILURE (HCC): ICD-10-CM

## 2017-07-02 DIAGNOSIS — I50.43 ACUTE ON CHRONIC COMBINED SYSTOLIC AND DIASTOLIC CONGESTIVE HEART FAILURE (HCC): Primary | ICD-10-CM

## 2017-07-02 LAB
ALBUMIN SERPL-MCNC: 4.6 G/DL (ref 3.5–5.2)
ALBUMIN/GLOB SERPL: 1.6 G/DL
ALP SERPL-CCNC: 91 U/L (ref 39–117)
ALT SERPL W P-5'-P-CCNC: 27 U/L (ref 1–33)
ANION GAP SERPL CALCULATED.3IONS-SCNC: 16.8 MMOL/L
AST SERPL-CCNC: 26 U/L (ref 1–32)
BASOPHILS # BLD AUTO: 0.03 10*3/MM3 (ref 0–0.2)
BASOPHILS NFR BLD AUTO: 0.2 % (ref 0–1.5)
BILIRUB SERPL-MCNC: 0.4 MG/DL (ref 0.1–1.2)
BUN BLD-MCNC: 26 MG/DL (ref 8–23)
BUN/CREAT SERPL: 15.8 (ref 7–25)
CALCIUM SPEC-SCNC: 10.6 MG/DL (ref 8.2–9.6)
CHLORIDE SERPL-SCNC: 101 MMOL/L (ref 98–107)
CO2 SERPL-SCNC: 23.2 MMOL/L (ref 22–29)
CREAT BLD-MCNC: 1.65 MG/DL (ref 0.57–1)
DEPRECATED RDW RBC AUTO: 56.4 FL (ref 37–54)
DIGOXIN SERPL-MCNC: 0.6 NG/ML (ref 0.6–1.2)
EOSINOPHIL # BLD AUTO: 0.1 10*3/MM3 (ref 0–0.7)
EOSINOPHIL NFR BLD AUTO: 0.7 % (ref 0.3–6.2)
ERYTHROCYTE [DISTWIDTH] IN BLOOD BY AUTOMATED COUNT: 16.1 % (ref 11.7–13)
GFR SERPL CREATININE-BSD FRML MDRD: 29 ML/MIN/1.73
GLOBULIN UR ELPH-MCNC: 2.9 GM/DL
GLUCOSE BLD-MCNC: 146 MG/DL (ref 65–99)
HCT VFR BLD AUTO: 31.8 % (ref 35.6–45.5)
HGB BLD-MCNC: 9.8 G/DL (ref 11.9–15.5)
HOLD SPECIMEN: NORMAL
HOLD SPECIMEN: NORMAL
IMM GRANULOCYTES # BLD: 0.07 10*3/MM3 (ref 0–0.03)
IMM GRANULOCYTES NFR BLD: 0.5 % (ref 0–0.5)
LYMPHOCYTES # BLD AUTO: 1 10*3/MM3 (ref 0.9–4.8)
LYMPHOCYTES NFR BLD AUTO: 7.2 % (ref 19.6–45.3)
MCH RBC QN AUTO: 29.3 PG (ref 26.9–32)
MCHC RBC AUTO-ENTMCNC: 30.8 G/DL (ref 32.4–36.3)
MCV RBC AUTO: 94.9 FL (ref 80.5–98.2)
MONOCYTES # BLD AUTO: 1.31 10*3/MM3 (ref 0.2–1.2)
MONOCYTES NFR BLD AUTO: 9.5 % (ref 5–12)
NEUTROPHILS # BLD AUTO: 11.35 10*3/MM3 (ref 1.9–8.1)
NEUTROPHILS NFR BLD AUTO: 81.9 % (ref 42.7–76)
NT-PROBNP SERPL-MCNC: 1832 PG/ML (ref 0–1800)
PLATELET # BLD AUTO: 212 10*3/MM3 (ref 140–500)
PMV BLD AUTO: 10.7 FL (ref 6–12)
POTASSIUM BLD-SCNC: 4.3 MMOL/L (ref 3.5–5.2)
PROT SERPL-MCNC: 7.5 G/DL (ref 6–8.5)
RBC # BLD AUTO: 3.35 10*6/MM3 (ref 3.9–5.2)
SODIUM BLD-SCNC: 141 MMOL/L (ref 136–145)
TROPONIN T SERPL-MCNC: 0.01 NG/ML (ref 0–0.03)
WBC NRBC COR # BLD: 13.86 10*3/MM3 (ref 4.5–10.7)
WHOLE BLOOD HOLD SPECIMEN: NORMAL
WHOLE BLOOD HOLD SPECIMEN: NORMAL

## 2017-07-02 PROCEDURE — 80162 ASSAY OF DIGOXIN TOTAL: CPT | Performed by: EMERGENCY MEDICINE

## 2017-07-02 PROCEDURE — 96366 THER/PROPH/DIAG IV INF ADDON: CPT

## 2017-07-02 PROCEDURE — 85025 COMPLETE CBC W/AUTO DIFF WBC: CPT | Performed by: EMERGENCY MEDICINE

## 2017-07-02 PROCEDURE — 93005 ELECTROCARDIOGRAM TRACING: CPT

## 2017-07-02 PROCEDURE — 25010000002 FUROSEMIDE PER 20 MG: Performed by: EMERGENCY MEDICINE

## 2017-07-02 PROCEDURE — 25010000002 HEPARIN (PORCINE) PER 1000 UNITS: Performed by: INTERNAL MEDICINE

## 2017-07-02 PROCEDURE — G0378 HOSPITAL OBSERVATION PER HR: HCPCS

## 2017-07-02 PROCEDURE — 99284 EMERGENCY DEPT VISIT MOD MDM: CPT

## 2017-07-02 PROCEDURE — 93010 ELECTROCARDIOGRAM REPORT: CPT | Performed by: INTERNAL MEDICINE

## 2017-07-02 PROCEDURE — 80053 COMPREHEN METABOLIC PANEL: CPT | Performed by: EMERGENCY MEDICINE

## 2017-07-02 PROCEDURE — 25010000002 HYDRALAZINE PER 20 MG: Performed by: EMERGENCY MEDICINE

## 2017-07-02 PROCEDURE — 84484 ASSAY OF TROPONIN QUANT: CPT | Performed by: EMERGENCY MEDICINE

## 2017-07-02 PROCEDURE — 96365 THER/PROPH/DIAG IV INF INIT: CPT

## 2017-07-02 PROCEDURE — 71020 HC CHEST PA AND LATERAL: CPT

## 2017-07-02 PROCEDURE — 96372 THER/PROPH/DIAG INJ SC/IM: CPT

## 2017-07-02 PROCEDURE — 83880 ASSAY OF NATRIURETIC PEPTIDE: CPT | Performed by: EMERGENCY MEDICINE

## 2017-07-02 RX ORDER — POTASSIUM CHLORIDE 1.5 G/1.77G
20 POWDER, FOR SOLUTION ORAL DAILY
Status: DISCONTINUED | OUTPATIENT
Start: 2017-07-03 | End: 2017-07-02 | Stop reason: CLARIF

## 2017-07-02 RX ORDER — DIGOXIN 125 MCG
125 TABLET ORAL EVERY OTHER DAY
COMMUNITY
End: 2017-07-05 | Stop reason: HOSPADM

## 2017-07-02 RX ORDER — DIGOXIN 125 MCG
125 TABLET ORAL EVERY OTHER DAY
Status: DISCONTINUED | OUTPATIENT
Start: 2017-07-02 | End: 2017-07-05

## 2017-07-02 RX ORDER — QUINAPRIL 20 MG/1
20 TABLET ORAL 2 TIMES DAILY
Status: DISCONTINUED | OUTPATIENT
Start: 2017-07-02 | End: 2017-07-05

## 2017-07-02 RX ORDER — SODIUM CHLORIDE 0.9 % (FLUSH) 0.9 %
10 SYRINGE (ML) INJECTION AS NEEDED
Status: DISCONTINUED | OUTPATIENT
Start: 2017-07-02 | End: 2017-07-05 | Stop reason: HOSPADM

## 2017-07-02 RX ORDER — SODIUM CHLORIDE 0.9 % (FLUSH) 0.9 %
1-10 SYRINGE (ML) INJECTION AS NEEDED
Status: DISCONTINUED | OUTPATIENT
Start: 2017-07-02 | End: 2017-07-05 | Stop reason: HOSPADM

## 2017-07-02 RX ORDER — ASPIRIN 81 MG/1
81 TABLET, CHEWABLE ORAL DAILY
Status: DISCONTINUED | OUTPATIENT
Start: 2017-07-03 | End: 2017-07-05 | Stop reason: HOSPADM

## 2017-07-02 RX ORDER — HEPARIN SODIUM 5000 [USP'U]/ML
5000 INJECTION, SOLUTION INTRAVENOUS; SUBCUTANEOUS EVERY 12 HOURS SCHEDULED
Status: DISCONTINUED | OUTPATIENT
Start: 2017-07-02 | End: 2017-07-05 | Stop reason: HOSPADM

## 2017-07-02 RX ORDER — PANTOPRAZOLE SODIUM 40 MG/1
40 TABLET, DELAYED RELEASE ORAL
Status: DISCONTINUED | OUTPATIENT
Start: 2017-07-03 | End: 2017-07-05 | Stop reason: HOSPADM

## 2017-07-02 RX ORDER — HYDRALAZINE HYDROCHLORIDE 20 MG/ML
10 INJECTION INTRAMUSCULAR; INTRAVENOUS ONCE
Status: COMPLETED | OUTPATIENT
Start: 2017-07-02 | End: 2017-07-02

## 2017-07-02 RX ORDER — AMIODARONE HYDROCHLORIDE 200 MG/1
200 TABLET ORAL
Status: DISCONTINUED | OUTPATIENT
Start: 2017-07-03 | End: 2017-07-05 | Stop reason: HOSPADM

## 2017-07-02 RX ORDER — MELATONIN
2000 DAILY
COMMUNITY

## 2017-07-02 RX ORDER — POTASSIUM CHLORIDE 750 MG/1
20 CAPSULE, EXTENDED RELEASE ORAL DAILY
Status: DISCONTINUED | OUTPATIENT
Start: 2017-07-03 | End: 2017-07-05 | Stop reason: HOSPADM

## 2017-07-02 RX ORDER — HYDRALAZINE HYDROCHLORIDE 50 MG/1
50 TABLET, FILM COATED ORAL EVERY 12 HOURS SCHEDULED
Status: DISCONTINUED | OUTPATIENT
Start: 2017-07-02 | End: 2017-07-04

## 2017-07-02 RX ORDER — FUROSEMIDE 10 MG/ML
20 INJECTION INTRAMUSCULAR; INTRAVENOUS ONCE
Status: COMPLETED | OUTPATIENT
Start: 2017-07-02 | End: 2017-07-02

## 2017-07-02 RX ORDER — ONDANSETRON 2 MG/ML
4 INJECTION INTRAMUSCULAR; INTRAVENOUS EVERY 6 HOURS PRN
Status: DISCONTINUED | OUTPATIENT
Start: 2017-07-02 | End: 2017-07-05 | Stop reason: HOSPADM

## 2017-07-02 RX ORDER — BUMETANIDE 0.25 MG/ML
1 INJECTION INTRAMUSCULAR; INTRAVENOUS EVERY 12 HOURS
Status: DISCONTINUED | OUTPATIENT
Start: 2017-07-03 | End: 2017-07-04

## 2017-07-02 RX ORDER — ACETAMINOPHEN 325 MG/1
650 TABLET ORAL EVERY 4 HOURS PRN
Status: DISCONTINUED | OUTPATIENT
Start: 2017-07-02 | End: 2017-07-05 | Stop reason: HOSPADM

## 2017-07-02 RX ORDER — PAROXETINE HYDROCHLORIDE 40 MG/1
40 TABLET, FILM COATED ORAL DAILY
Status: DISCONTINUED | OUTPATIENT
Start: 2017-07-03 | End: 2017-07-05 | Stop reason: HOSPADM

## 2017-07-02 RX ORDER — MELATONIN
2000 DAILY
Status: DISCONTINUED | OUTPATIENT
Start: 2017-07-03 | End: 2017-07-05 | Stop reason: HOSPADM

## 2017-07-02 RX ORDER — LEVOTHYROXINE SODIUM 88 UG/1
88 TABLET ORAL
Status: DISCONTINUED | OUTPATIENT
Start: 2017-07-03 | End: 2017-07-05 | Stop reason: HOSPADM

## 2017-07-02 RX ADMIN — HEPARIN SODIUM 5000 UNITS: 5000 INJECTION, SOLUTION INTRAVENOUS; SUBCUTANEOUS at 23:54

## 2017-07-02 RX ADMIN — FUROSEMIDE 20 MG: 10 INJECTION, SOLUTION INTRAMUSCULAR; INTRAVENOUS at 18:37

## 2017-07-02 RX ADMIN — BUMETANIDE 0.5 MG/HR: 0.25 INJECTION INTRAMUSCULAR; INTRAVENOUS at 19:20

## 2017-07-02 RX ADMIN — QUINAPRIL HYDROCHLORIDE 20 MG: 20 TABLET, FILM COATED ORAL at 23:11

## 2017-07-02 RX ADMIN — ACETAMINOPHEN 650 MG: 325 TABLET ORAL at 23:54

## 2017-07-02 RX ADMIN — DIGOXIN 125 MCG: 125 TABLET ORAL at 23:11

## 2017-07-02 RX ADMIN — HYDRALAZINE HYDROCHLORIDE 50 MG: 50 TABLET, FILM COATED ORAL at 23:11

## 2017-07-02 RX ADMIN — HYDRALAZINE HYDROCHLORIDE 10 MG: 20 INJECTION INTRAMUSCULAR; INTRAVENOUS at 18:05

## 2017-07-02 NOTE — ED PROVIDER NOTES
"EMERGENCY DEPARTMENT ENCOUNTER       CHIEF COMPLAINT  Chief Complaint: Dyspnea  History given by: Patient, Family  History limited by: N/A  Room Number: 22/22  PMD: Gurpreet Maya MD      HPI:  HPI Comments: Pt presents to the ED c/o dyspnea onset about a month ago. Sx worsen with exertion and improve with rest. Pt has seen her PMD for this and had recent medication changes. Per family, pt's Hydralazine 50 mg was increased to three times a day, pt's Lasix was decreased to 40 mg three times weekly, and pt's Digoxin was decreased to three times weekly. Pt denies CP, abd pain, N/V/D, and palpitations, but reports that she has had \"pressure\" to the neck/head and mild lightheadedness. Pt reports that she typically uses a CPAP machine only at night, but for the past several days, pt has been using her CPAP machine in the daytime also due to the dyspnea. There are no other complaints at this time.     Dr. Sinha - cardiologist         Patient is a 89 y.o. female presenting with shortness of breath.   Shortness of Breath   Severity:  Moderate  Onset quality:  Gradual  Duration: onset about a month ago.  Timing:  Intermittent  Progression:  Worsening (gradually)  Context: not pollens    Relieved by:  Oxygen and rest (occasionally)  Worsened by:  Exertion  Associated symptoms: no abdominal pain, no chest pain, no cough, no rash, no sore throat and no vomiting          PAST MEDICAL HISTORY  Active Ambulatory Problems     Diagnosis Date Noted   • Abdominal bloating 03/28/2016   • Left lower quadrant pain 03/28/2016   • Left upper quadrant pain 03/28/2016   • Allergic rhinitis 03/28/2016   • Anemia 03/28/2016   • Ankle joint pain 03/28/2016   • Atrial fibrillation 03/28/2016   • Chronic kidney disease, stage III (moderate) 03/28/2016   • Congestive heart failure 03/28/2016   • Carpal tunnel syndrome 03/28/2016   • Depression 03/28/2016   • Diarrhea 03/28/2016   • Diverticulosis of large intestine 03/28/2016   • " Gastroesophageal reflux disease 03/28/2016   • Essential hypertension 03/28/2016   • Excessive flatus 03/28/2016   • Fatigue 03/28/2016   • Abdominal wall hernia 03/28/2016   • Hyperlipidemia 03/28/2016   • Hypertension 03/28/2016   • Hypokalemia 03/28/2016   • Hypothyroidism 03/28/2016   • Intestinal obstruction 03/28/2016   • Impacted cerumen 03/28/2016   • Insomnia 03/28/2016   • Iron deficiency 03/28/2016   • Pain in extremity 03/28/2016   • Swelling of limb 03/28/2016   • Osteoarthritis of hand 03/28/2016   • Osteoarthritis of knee 03/28/2016   • Pulmonary hypertension 03/28/2016   • Urinary tract infection 03/28/2016   • Vitamin D deficiency 03/28/2016   • 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   • Left foot pain 09/28/2016   • Medicare annual wellness visit, initial 03/29/2017   • Abnormal Romberg test 03/29/2017   • LINDER (dyspnea on exertion) 06/06/2017   • Fall at home 06/15/2017   • Blunt head trauma 06/15/2017     Resolved Ambulatory Problems     Diagnosis Date Noted   • No Resolved Ambulatory Problems     Past Medical History:   Diagnosis Date   • Anemia    • Arthritis    • CHF (congestive heart failure)    • Depression    • Diverticulosis    • GERD (gastroesophageal reflux disease)    • Hyperlipidemia    • Hypertension    • Hypothyroidism    • Neuromuscular disorder    • Urinary tract infection          PAST SURGICAL HISTORY  History reviewed. No pertinent surgical history.      FAMILY HISTORY  Family History   Problem Relation Age of Onset   • Coronary artery disease Other    • Hypertension Other    • Hyperlipidemia Other          SOCIAL HISTORY  Social History     Social History   • Marital status:      Spouse name: N/A   • Number of children: N/A   • Years of education: N/A     Occupational History   • Not on file.     Social History Main Topics   • Smoking status: Former Smoker   • Smokeless tobacco: Not on file   • Alcohol use No   • Drug use: Not on file   • Sexual activity: Not on file     Other Topics  "Concern   • Not on file     Social History Narrative         ALLERGIES  Amlodipine besy-benazepril hcl; Metoclopramide; and Nisoldipine        REVIEW OF SYSTEMS  Review of Systems   Constitutional: Negative for chills.   HENT: Negative for congestion, rhinorrhea and sore throat.    Eyes: Negative for pain.   Respiratory: Positive for shortness of breath. Negative for cough.    Cardiovascular: Negative for chest pain, palpitations and leg swelling.   Gastrointestinal: Negative for abdominal pain, diarrhea, nausea and vomiting.   Genitourinary: Negative for difficulty urinating, dysuria, flank pain and frequency.   Musculoskeletal: Negative for neck stiffness.        \"pressure\" to the head and neck   Skin: Negative for rash.   Neurological: Positive for light-headedness (mild). Negative for speech difficulty, weakness and numbness.   Psychiatric/Behavioral: Negative.    All other systems reviewed and are negative.           PHYSICAL EXAM  ED Triage Vitals   Temp Heart Rate Resp BP SpO2   07/02/17 1622 07/02/17 1622 07/02/17 1622 07/02/17 1626 07/02/17 1622   98.1 °F (36.7 °C) 60 16 198/68 90 %      Temp src Heart Rate Source Patient Position BP Location FiO2 (%)   07/02/17 1622 -- -- -- --   Tympanic           Physical Exam   Constitutional: She is oriented to person, place, and time. No distress.   HENT:   Head: Normocephalic.   Mouth/Throat: Mucous membranes are normal.   Eyes: EOM are normal. Pupils are equal, round, and reactive to light.   Neck: Normal range of motion. Neck supple.   Cardiovascular: Normal rate, regular rhythm and normal heart sounds.    Pulmonary/Chest: Effort normal. No respiratory distress. She has decreased breath sounds (bilaterally). She has no wheezes. She has no rhonchi. She has no rales.   Abdominal: Soft. There is no tenderness. There is no rebound and no guarding.   Musculoskeletal: Normal range of motion. She exhibits edema (mild to moderate BLE edema).   No calf tenderness " bilaterally.    Neurological: She is alert and oriented to person, place, and time. She has normal sensation.   Skin: Skin is warm and dry.   Psychiatric: Mood and affect normal.   Nursing note and vitals reviewed.          LAB RESULTS  Recent Results (from the past 24 hour(s))   Comprehensive Metabolic Panel    Collection Time: 07/02/17  5:07 PM   Result Value Ref Range    Glucose 146 (H) 65 - 99 mg/dL    BUN 26 (H) 8 - 23 mg/dL    Creatinine 1.65 (H) 0.57 - 1.00 mg/dL    Sodium 141 136 - 145 mmol/L    Potassium 4.3 3.5 - 5.2 mmol/L    Chloride 101 98 - 107 mmol/L    CO2 23.2 22.0 - 29.0 mmol/L    Calcium 10.6 (H) 8.2 - 9.6 mg/dL    Total Protein 7.5 6.0 - 8.5 g/dL    Albumin 4.60 3.50 - 5.20 g/dL    ALT (SGPT) 27 1 - 33 U/L    AST (SGOT) 26 1 - 32 U/L    Alkaline Phosphatase 91 39 - 117 U/L    Total Bilirubin 0.4 0.1 - 1.2 mg/dL    eGFR Non African Amer 29 (L) >60 mL/min/1.73    Globulin 2.9 gm/dL    A/G Ratio 1.6 g/dL    BUN/Creatinine Ratio 15.8 7.0 - 25.0    Anion Gap 16.8 mmol/L   BNP    Collection Time: 07/02/17  5:07 PM   Result Value Ref Range    proBNP 1832.0 (H) 0.0 - 1800.0 pg/mL   Troponin    Collection Time: 07/02/17  5:07 PM   Result Value Ref Range    Troponin T 0.011 0.000 - 0.030 ng/mL   Digoxin Level    Collection Time: 07/02/17  5:07 PM   Result Value Ref Range    Digoxin 0.60 0.60 - 1.20 ng/mL   CBC Auto Differential    Collection Time: 07/02/17  5:07 PM   Result Value Ref Range    WBC 13.86 (H) 4.50 - 10.70 10*3/mm3    RBC 3.35 (L) 3.90 - 5.20 10*6/mm3    Hemoglobin 9.8 (L) 11.9 - 15.5 g/dL    Hematocrit 31.8 (L) 35.6 - 45.5 %    MCV 94.9 80.5 - 98.2 fL    MCH 29.3 26.9 - 32.0 pg    MCHC 30.8 (L) 32.4 - 36.3 g/dL    RDW 16.1 (H) 11.7 - 13.0 %    RDW-SD 56.4 (H) 37.0 - 54.0 fl    MPV 10.7 6.0 - 12.0 fL    Platelets 212 140 - 500 10*3/mm3    Neutrophil % 81.9 (H) 42.7 - 76.0 %    Lymphocyte % 7.2 (L) 19.6 - 45.3 %    Monocyte % 9.5 5.0 - 12.0 %    Eosinophil % 0.7 0.3 - 6.2 %    Basophil % 0.2  0.0 - 1.5 %    Immature Grans % 0.5 0.0 - 0.5 %    Neutrophils, Absolute 11.35 (H) 1.90 - 8.10 10*3/mm3    Lymphocytes, Absolute 1.00 0.90 - 4.80 10*3/mm3    Monocytes, Absolute 1.31 (H) 0.20 - 1.20 10*3/mm3    Eosinophils, Absolute 0.10 0.00 - 0.70 10*3/mm3    Basophils, Absolute 0.03 0.00 - 0.20 10*3/mm3    Immature Grans, Absolute 0.07 (H) 0.00 - 0.03 10*3/mm3       Ordered the above labs and reviewed the results.        RADIOLOGY  XR Chest 2 View   Preliminary Result   Mild pulmonary vascular engorgement, edema, and small   pleural effusions.    Interpreted by radiologist. Independently viewed by me.                Ordered the above noted radiological studies. Reviewed by me in PACS.       PROCEDURES  Procedures    EKG:           EKG time: 16:50  Rhythm/Rate: Paced rhythm rate 70  QRS, axis: LAD       Interpreted Contemporaneously by me, independently viewed  unchanged compared to prior 05/30/17              PROGRESS AND CONSULTS  ED Course     5:56 PM: Per RN, pt's O2 sat on room air in the ER was 85%. Hydralazine 10 mg ordered to treat for elevated BP.     6:28 PM: Rechecked pt. Pt is resting comfortably and appears in no acute distress. Pt is currently on O2 nasal cannula. Informed pt and family that pt's BNP is 1832. CXR shows mild pulmonary edema and small pleural effusions. However, as pt is hypoxic with O2 sat of 85% on room air, need for admission to treat for hypoxia and CHF. Pt and family agree with plan. Decision time to admit: Now.     6:30 PM: Placed call to A for admission. Lasix 20 mg ordered to magali pt.     6:49 PM: Discussed case with Dr. Valles, hospitalist. He will admit pt to a telemetry bed. He would like pt started on a Bumex drip.     6:57 PM: Bumex drip ordered as d/w Dr. Valles.           MEDICAL DECISION MAKING      MDM  Number of Diagnoses or Management Options     Amount and/or Complexity of Data Reviewed  Clinical lab tests: reviewed and ordered (BNP is 1832. )  Tests in the  radiology section of CPT®: reviewed and ordered (CXR: There is a cardiac pacing device. The cardiac silhouette is  enlarged but unchanged. Pulmonary vasculature is mildly prominent and  there is hazy increased density at both lower lung zones and small  pleural effusions. No focal infiltrate is evident. There is no  pneumothorax. There is posttraumatic deformity of the left humeral head.)  Tests in the medicine section of CPT®: ordered and reviewed (EKG interpreted.   )  Decide to obtain previous medical records or to obtain history from someone other than the patient: yes  Review and summarize past medical records: yes (Pt's BNP was 1505 about a month ago. Pt's Hgb was 8.6 about a month ago. Pt's creatinine was 1.8 about a month ago. )  Discuss the patient with other providers: yes (Case d/w Dr. Valles, hospitalist, who will admit pt to a telemetry bed.   )    Patient Progress  Patient progress: stable             DIAGNOSIS  Final diagnoses:   Acute on chronic combined systolic and diastolic congestive heart failure   CKD (chronic kidney disease), stage 3 (moderate)   Hypoxia   Essential hypertension         DISPOSITION  Pt admitted to telemetry.      ADMISSION    Discussed treatment plan and reason for admission with pt/family and admitting physician.  Pt/family voiced understanding of the plan for admission for further testing/treatment as needed.               Latest Documented Vital Signs:  As of 6:55 PM  BP- (!) 190/70 HR- 60 Temp- 98.1 °F (36.7 °C) (Tympanic) O2 sat- 96%        --  Documentation assistance provided by mariela Aceves for Dr. Leanna MD.  Information recorded by the scribe was done at my direction and has been verified and validated by me.            Sudhir Aceves  07/02/17 1900       Moy Ram MD  07/02/17 3518

## 2017-07-02 NOTE — ED NOTES
PT. C/O SOA AND PRESSURE IN HER NECK AND HEAD. PT. ROOM AIR SAT WAS 85%.  PT. PLACED ON 2 L/M NC AND WENT UP 97%.     Felipe Watson RN  07/02/17 7718

## 2017-07-03 PROBLEM — Z66 DNR (DO NOT RESUSCITATE): Status: ACTIVE | Noted: 2017-07-03

## 2017-07-03 LAB
ALBUMIN SERPL-MCNC: 4 G/DL (ref 3.5–5.2)
ALBUMIN/GLOB SERPL: 1.5 G/DL
ALP SERPL-CCNC: 76 U/L (ref 39–117)
ALT SERPL W P-5'-P-CCNC: 17 U/L (ref 1–33)
ANION GAP SERPL CALCULATED.3IONS-SCNC: 15 MMOL/L
AST SERPL-CCNC: 19 U/L (ref 1–32)
BASOPHILS # BLD AUTO: 0.04 10*3/MM3 (ref 0–0.2)
BASOPHILS NFR BLD AUTO: 0.4 % (ref 0–1.5)
BILIRUB SERPL-MCNC: 0.5 MG/DL (ref 0.1–1.2)
BUN BLD-MCNC: 23 MG/DL (ref 8–23)
BUN/CREAT SERPL: 15.3 (ref 7–25)
CALCIUM SPEC-SCNC: 9.8 MG/DL (ref 8.2–9.6)
CHLORIDE SERPL-SCNC: 101 MMOL/L (ref 98–107)
CHOLEST SERPL-MCNC: 158 MG/DL (ref 0–200)
CO2 SERPL-SCNC: 26 MMOL/L (ref 22–29)
CREAT BLD-MCNC: 1.5 MG/DL (ref 0.57–1)
DEPRECATED RDW RBC AUTO: 55 FL (ref 37–54)
DIGOXIN SERPL-MCNC: 1.2 NG/ML (ref 0.6–1.2)
EOSINOPHIL # BLD AUTO: 0.14 10*3/MM3 (ref 0–0.7)
EOSINOPHIL NFR BLD AUTO: 1.5 % (ref 0.3–6.2)
ERYTHROCYTE [DISTWIDTH] IN BLOOD BY AUTOMATED COUNT: 16 % (ref 11.7–13)
GFR SERPL CREATININE-BSD FRML MDRD: 33 ML/MIN/1.73
GLOBULIN UR ELPH-MCNC: 2.6 GM/DL
GLUCOSE BLD-MCNC: 116 MG/DL (ref 65–99)
HCT VFR BLD AUTO: 28.7 % (ref 35.6–45.5)
HDLC SERPL-MCNC: 45 MG/DL (ref 40–60)
HGB BLD-MCNC: 8.9 G/DL (ref 11.9–15.5)
IMM GRANULOCYTES # BLD: 0.04 10*3/MM3 (ref 0–0.03)
IMM GRANULOCYTES NFR BLD: 0.4 % (ref 0–0.5)
INR PPP: 1.27 (ref 0.9–1.1)
LDLC SERPL CALC-MCNC: 92 MG/DL (ref 0–100)
LDLC/HDLC SERPL: 2.04 {RATIO}
LYMPHOCYTES # BLD AUTO: 1.22 10*3/MM3 (ref 0.9–4.8)
LYMPHOCYTES NFR BLD AUTO: 13.3 % (ref 19.6–45.3)
MCH RBC QN AUTO: 29.2 PG (ref 26.9–32)
MCHC RBC AUTO-ENTMCNC: 31 G/DL (ref 32.4–36.3)
MCV RBC AUTO: 94.1 FL (ref 80.5–98.2)
MONOCYTES # BLD AUTO: 0.89 10*3/MM3 (ref 0.2–1.2)
MONOCYTES NFR BLD AUTO: 9.7 % (ref 5–12)
NEUTROPHILS # BLD AUTO: 6.81 10*3/MM3 (ref 1.9–8.1)
NEUTROPHILS NFR BLD AUTO: 74.7 % (ref 42.7–76)
NT-PROBNP SERPL-MCNC: 2178 PG/ML (ref 0–1800)
PLATELET # BLD AUTO: 174 10*3/MM3 (ref 140–500)
PMV BLD AUTO: 10.8 FL (ref 6–12)
POTASSIUM BLD-SCNC: 3.5 MMOL/L (ref 3.5–5.2)
PROT SERPL-MCNC: 6.6 G/DL (ref 6–8.5)
PROTHROMBIN TIME: 15.5 SECONDS (ref 11.7–14.2)
RBC # BLD AUTO: 3.05 10*6/MM3 (ref 3.9–5.2)
SODIUM BLD-SCNC: 142 MMOL/L (ref 136–145)
TRIGL SERPL-MCNC: 107 MG/DL (ref 0–150)
TROPONIN T SERPL-MCNC: <0.01 NG/ML (ref 0–0.03)
TSH SERPL DL<=0.05 MIU/L-ACNC: 4.4 MIU/ML (ref 0.27–4.2)
VLDLC SERPL-MCNC: 21.4 MG/DL (ref 5–40)
WBC NRBC COR # BLD: 9.14 10*3/MM3 (ref 4.5–10.7)

## 2017-07-03 PROCEDURE — G0378 HOSPITAL OBSERVATION PER HR: HCPCS

## 2017-07-03 PROCEDURE — 83880 ASSAY OF NATRIURETIC PEPTIDE: CPT | Performed by: INTERNAL MEDICINE

## 2017-07-03 PROCEDURE — 96366 THER/PROPH/DIAG IV INF ADDON: CPT

## 2017-07-03 PROCEDURE — 84484 ASSAY OF TROPONIN QUANT: CPT | Performed by: INTERNAL MEDICINE

## 2017-07-03 PROCEDURE — 85610 PROTHROMBIN TIME: CPT | Performed by: INTERNAL MEDICINE

## 2017-07-03 PROCEDURE — 96376 TX/PRO/DX INJ SAME DRUG ADON: CPT

## 2017-07-03 PROCEDURE — 80061 LIPID PANEL: CPT | Performed by: INTERNAL MEDICINE

## 2017-07-03 PROCEDURE — 84443 ASSAY THYROID STIM HORMONE: CPT | Performed by: INTERNAL MEDICINE

## 2017-07-03 PROCEDURE — 80162 ASSAY OF DIGOXIN TOTAL: CPT | Performed by: INTERNAL MEDICINE

## 2017-07-03 PROCEDURE — 85025 COMPLETE CBC W/AUTO DIFF WBC: CPT | Performed by: INTERNAL MEDICINE

## 2017-07-03 PROCEDURE — 25010000002 HEPARIN (PORCINE) PER 1000 UNITS: Performed by: INTERNAL MEDICINE

## 2017-07-03 PROCEDURE — 99204 OFFICE O/P NEW MOD 45 MIN: CPT | Performed by: INTERNAL MEDICINE

## 2017-07-03 PROCEDURE — 96372 THER/PROPH/DIAG INJ SC/IM: CPT

## 2017-07-03 PROCEDURE — 80053 COMPREHEN METABOLIC PANEL: CPT | Performed by: INTERNAL MEDICINE

## 2017-07-03 RX ORDER — BOSENTAN 125 MG/1
125 TABLET, FILM COATED ORAL 2 TIMES DAILY
Status: DISCONTINUED | OUTPATIENT
Start: 2017-07-03 | End: 2017-07-05 | Stop reason: HOSPADM

## 2017-07-03 RX ADMIN — ASPIRIN 81 MG: 81 TABLET, CHEWABLE ORAL at 09:20

## 2017-07-03 RX ADMIN — HYDRALAZINE HYDROCHLORIDE 50 MG: 50 TABLET, FILM COATED ORAL at 09:20

## 2017-07-03 RX ADMIN — HYDRALAZINE HYDROCHLORIDE 50 MG: 50 TABLET, FILM COATED ORAL at 21:26

## 2017-07-03 RX ADMIN — BUMETANIDE 1 MG: 0.25 INJECTION, SOLUTION INTRAMUSCULAR; INTRAVENOUS at 02:18

## 2017-07-03 RX ADMIN — LEVOTHYROXINE SODIUM 88 MCG: 88 TABLET ORAL at 09:19

## 2017-07-03 RX ADMIN — PANTOPRAZOLE SODIUM 40 MG: 40 TABLET, DELAYED RELEASE ORAL at 05:46

## 2017-07-03 RX ADMIN — POTASSIUM CHLORIDE 20 MEQ: 750 CAPSULE, EXTENDED RELEASE ORAL at 09:20

## 2017-07-03 RX ADMIN — QUINAPRIL HYDROCHLORIDE 20 MG: 20 TABLET, FILM COATED ORAL at 09:20

## 2017-07-03 RX ADMIN — AMIODARONE HYDROCHLORIDE 200 MG: 200 TABLET ORAL at 09:20

## 2017-07-03 RX ADMIN — HEPARIN SODIUM 5000 UNITS: 5000 INJECTION, SOLUTION INTRAVENOUS; SUBCUTANEOUS at 09:19

## 2017-07-03 RX ADMIN — BOSENTAN 125 MG: 125 TABLET, FILM COATED ORAL at 18:57

## 2017-07-03 RX ADMIN — PAROXETINE HYDROCHLORIDE 40 MG: 40 TABLET, FILM COATED ORAL at 09:20

## 2017-07-03 RX ADMIN — BUMETANIDE 1 MG: 0.25 INJECTION, SOLUTION INTRAMUSCULAR; INTRAVENOUS at 17:15

## 2017-07-03 RX ADMIN — VITAMIN D, TAB 1000IU (100/BT) 2000 UNITS: 25 TAB at 09:20

## 2017-07-03 RX ADMIN — HEPARIN SODIUM 5000 UNITS: 5000 INJECTION, SOLUTION INTRAVENOUS; SUBCUTANEOUS at 21:26

## 2017-07-03 RX ADMIN — QUINAPRIL HYDROCHLORIDE 20 MG: 20 TABLET, FILM COATED ORAL at 18:57

## 2017-07-03 NOTE — CONSULTS
Patient Name: Nehemias Lizama  :[id number]  89 y.o.    Date of Admission: 2017  Date of Consultation:  17  Encounter Provider: Gael Lubin III, MD  Place of Service: [facility] CARDIOLOGY  Referring Provider: Artem Valles MD  Patient Care Team:  Gurpreet Maya MD as PCP - General  Guprreet Maya MD as PCP - Family Medicine      Chief complaint:  CHF exacerbation    History of Present Illness:      Patient is a 89 year old female who is normally followed by Dr. Sinha with [facility] for management of hypertension, paroxysmal atrial flutter, diastolic heart failure, and sick sinus syndrome (s/p PPM).      She presented to the emergency room yesterday with complaint of shortness of breath as well as a feeling of congestion within her head.  She states that it had been getting worse over the last 2 weeks.  She had she saw her nephrologist 2 weeks ago and her Lasix was decreased at that time; she states that ever since then she has had progressive worsening shortness of breath.  She has been experiencing orthopnea.  She has had for excisional nocturnal dyspnea.  She didn't having worsening dyspnea with activity.  No shortness of breath if she was just sitting still and sitting upright.  She has not had any chest pain, pressure, tightness, squeezing, or heartburn.  No nausea, vomiting, or diaphoresis.  She has not experienced any chills or fevers.  She's had a cough that is been nonproductive.      In the emergency room she was diagnosed with acute heart failure-her  CXR- mild pulmonary vascular engorgement, edema, and small pleural effusions.  She received 20mg IV Lasix and IV Hydralazine in the ER.  IV Bumex ordered.      She has not had any sensation of tachycardia or palpitations.  No presyncope or syncope.    Echo- 17  · Left ventricular wall thickness is consistent with mild concentric hypertrophy.  · Left atrial cavity size is moderately  dilated.  · Right ventricular cavity is mild-to-moderately dilated.  · Mild mitral valve regurgitation is present  · Moderate tricuspid valve regurgitation is present.  · Mild aortic valve stenosis is present.  · Mild mitral valve stenosis is present  · Left ventricular systolic function is normal. Estimated EF = 65%.  · Left ventricular diastolic dysfunction (grade III) consistent with reversible restrictive pattern.    Past Medical History:   Diagnosis Date   • Anemia    • Arthritis    • CHF (congestive heart failure)    • Depression    • Diverticulosis    • GERD (gastroesophageal reflux disease)    • Hyperlipidemia    • Hypertension    • Hypothyroidism    • Neuromuscular disorder    • Urinary tract infection        History reviewed. No pertinent surgical history.      Prior to Admission medications    Medication Sig Start Date End Date Taking? Authorizing Provider   amiodarone (PACERONE) 200 MG tablet Take  by mouth. 3/26/13  Yes Historical Provider, MD   aspirin 81 MG chewable tablet Chew 81 mg Daily.   Yes Historical Provider, MD   bosentan (TRACLEER) 125 MG tablet Take  by mouth 2 (two) times a day. 6/25/12  Yes Historical Provider, MD   cholecalciferol (VITAMIN D3) 1000 UNITS tablet Take 2,000 Units by mouth Daily.   Yes Historical Provider, MD   digoxin (LANOXIN) 125 MCG tablet Take 125 mcg by mouth Every Other Day.   Yes Historical Provider, MD   furosemide (LASIX) 40 MG tablet TAKE ONE AND ONE-HALF TABLETS DAILY  Patient taking differently: takes 1 tablet every other day 4/24/17  Yes Gurpreet Maya MD   hydrALAZINE (APRESOLINE) 50 MG tablet TAKE 1 TABLET THREE TIMES A DAY 6/12/17  Yes Gurpreet Maya MD   Iloprost 20 MCG/ML solution Ventavis SOLN; Patient Sig: Ventavis SOLN ; 0; 04-Sep-2013; Active 9/4/13  Yes Historical Provider, MD   levothyroxine (SYNTHROID, LEVOTHROID) 88 MCG tablet TAKE 1 TABLET DAILY  Patient taking differently: TAKE 100mg DAILY 6/12/17  Yes Gurpreet Maya MD   omeprazole  "(priLOSEC) 20 MG capsule TAKE 1 CAPSULE DAILY 6/7/17  Yes Gurpreet Maya MD   PARoxetine (PAXIL) 40 MG tablet TAKE 1 TABLET DAILY 4/24/17  Yes Gurpreet Maya MD   potassium chloride (K-DUR,KLOR-CON) 20 MEQ CR tablet TAKE 1 TABLET DAILY  Patient taking differently: pt takes 10meq daily 4/13/17  Yes Gurpreet Maya MD   quinapril (ACCUPRIL) 10 MG tablet Take 20 mg by mouth 2 (Two) Times a Day. 4/22/14  Yes Historical Provider, MD   Artificial Saliva (MOUTH KOTE MT) Mouth Kote Mouth/Throat Solution; Patient Sig: Mouth Kote Mouth/Throat Solution USE AS DIRECTED AS A SALIVA SUBSTITUTE; 12; 0; 01-May-2013; Active 5/1/13   Historical Provider, MD   mirtazapine (REMERON) 30 MG tablet Take  by mouth. 5/27/15   Historical Provider, MD   montelukast (SINGULAIR) 10 MG tablet Take  by mouth daily. 1/24/14   Historical Provider, MD       Allergies   Allergen Reactions   • Amlodipine Besy-Benazepril Hcl    • Metoclopramide    • Nisoldipine        Social History     Social History   • Marital status:      Spouse name: N/A   • Number of children: N/A   • Years of education: N/A     Social History Main Topics   • Smoking status: Former Smoker   • Smokeless tobacco: None   • Alcohol use No   • Drug use: No   • Sexual activity: Defer     Other Topics Concern   • None     Social History Narrative       Family History   Problem Relation Age of Onset   • Coronary artery disease Other    • Hypertension Other    • Hyperlipidemia Other        REVIEW OF SYSTEMS:   All systems reviewed.  Pertinent positives identified in HPI.  All other systems are negative.      Objective:     Vitals:    07/02/17 1958 07/02/17 2311 07/03/17 0007 07/03/17 0528   BP: 176/70 161/56 172/59    BP Location: Left arm  Left arm    Patient Position: Lying  Lying    Pulse: 60  60    Resp: 16  16    Temp: 98.4 °F (36.9 °C)  98 °F (36.7 °C)    TempSrc: Oral  Oral    SpO2:   97%    Weight: 131 lb 8 oz (59.6 kg)   131 lb 3.2 oz (59.5 kg)   Height: 60\" " (152.4 cm)        Body mass index is 25.62 kg/(m^2).    Physical Exam:  General Appearance:    Alert, cooperative, in no acute distress   Head:    Normocephalic, without obvious abnormality, atraumatic   Eyes:            Lids and lashes normal, conjunctivae and sclerae normal, no   icterus, no pallor, corneas clear, PERRLA   Ears:    Ears appear intact with no abnormalities noted   Throat:   No oral lesions, no thrush, oral mucosa moist   Neck:   No adenopathy, supple, trachea midline, no thyromegaly, no   carotid bruit, no JVD   Back:     No kyphosis present, no scoliosis present, no skin lesions, erythema or scars, no tenderness to percussion or palpation, range of motion normal   Lungs:     Coarse BS,respirations regular, even and unlabored    Heart:    Regular rhythm and normal rate, normal S1 and S2, 1/6 HSM, 1/6 DANELLE, no gallop, no rub, no click   Chest Wall:    No abnormalities observed   Abdomen:     Normal bowel sounds, no masses, no organomegaly, soft        non-tender, non-distended, no guarding, no rebound  tenderness   Extremities:   Moves all extremities well, no edema, no cyanosis, no redness   Pulses:   Pulses palpable and equal bilaterally. Normal radial, carotid, femoral, dorsalis pedis and posterior tibial pulses bilaterally. Normal abdominal aorta   Skin:  Psychiatric:   No bleeding, bruising or rash    Alert and oriented x 3, normal mood and affect         Lab Review:       Results from last 7 days  Lab Units 07/03/17  0306   SODIUM mmol/L 142   POTASSIUM mmol/L 3.5   CHLORIDE mmol/L 101   CO2 mmol/L 26.0   BUN mg/dL 23   CREATININE mg/dL 1.50*   CALCIUM mg/dL 9.8*   BILIRUBIN mg/dL 0.5   ALK PHOS U/L 76   ALT (SGPT) U/L 17   AST (SGOT) U/L 19   GLUCOSE mg/dL 116*       Results from last 7 days  Lab Units 07/03/17  0306 07/02/17  1707   TROPONIN T ng/mL <0.010 0.011       Results from last 7 days  Lab Units 07/03/17  0306   WBC 10*3/mm3 9.14   HEMOGLOBIN g/dL 8.9*   HEMATOCRIT % 28.7*   PLATELETS  10*3/mm3 174       Results from last 7 days  Lab Units 07/03/17  0306   INR  1.27*           Results from last 7 days  Lab Units 07/03/17  0306   CHOLESTEROL mg/dL 158   TRIGLYCERIDES mg/dL 107   HDL CHOL mg/dL 45               I personally viewed and interpreted the patient's EKG/Telemetry data.        Current Facility-Administered Medications:   •  acetaminophen (TYLENOL) tablet 650 mg, 650 mg, Oral, Q4H PRN, Artem Valles MD, 650 mg at 07/02/17 2354  •  amiodarone (PACERONE) tablet 200 mg, 200 mg, Oral, Q24H, Aretm Valles MD  •  aspirin chewable tablet 81 mg, 81 mg, Oral, Daily, Artem Valles MD  •  bumetanide (BUMEX) injection 1 mg, 1 mg, Intravenous, Q12H, Artem Valles MD, 1 mg at 07/03/17 0218  •  cholecalciferol (VITAMIN D3) tablet 2,000 Units, 2,000 Units, Oral, Daily, Artem Valles MD  •  digoxin (LANOXIN) tablet 125 mcg, 125 mcg, Oral, Every Other Day, Artem Valles MD, 125 mcg at 07/02/17 2311  •  heparin (porcine) 5000 UNIT/ML injection 5,000 Units, 5,000 Units, Subcutaneous, Q12H, Artem Valles MD, 5,000 Units at 07/02/17 2354  •  hydrALAZINE (APRESOLINE) tablet 50 mg, 50 mg, Oral, Q12H, Artem Valles MD, 50 mg at 07/02/17 2311  •  levothyroxine (SYNTHROID, LEVOTHROID) tablet 88 mcg, 88 mcg, Oral, Q AM, Artem Valles MD  •  ondansetron (ZOFRAN) injection 4 mg, 4 mg, Intravenous, Q6H PRN, Artem Valles MD  •  pantoprazole (PROTONIX) EC tablet 40 mg, 40 mg, Oral, Q AM, Artem Valles MD, 40 mg at 07/03/17 0546  •  PARoxetine (PAXIL) tablet 40 mg, 40 mg, Oral, Daily, Artem Valles MD  •  potassium chloride (MICRO-K) CR capsule 20 mEq, 20 mEq, Oral, Daily, Artem Valles MD  •  quinapril (ACCUPRIL) tablet 20 mg, 20 mg, Oral, BID, Artem Valles MD, 20 mg at 07/02/17 3689  •  sodium chloride 0.9 % flush 1-10 mL, 1-10 mL, Intravenous, PRN, Artem Valles MD  •  sodium chloride 0.9 % flush 10 mL, 10 mL, Intravenous, PRN, Moy Ram MD      Assessment and Plan:       Active Hospital Problems (** Indicates Principal Problem)     Diagnosis Date Noted   • **Congestive heart failure [I50.9] 03/28/2016   • Anemia [D64.9] 03/28/2016   • Atrial fibrillation [I48.91] 03/28/2016   • Chronic kidney disease, stage III (moderate) [N18.3] 03/28/2016   • Hypertension [I10] 03/28/2016   • Hypothyroidism [E03.9] 03/28/2016      Resolved Hospital Problems    Diagnosis Date Noted Date Resolved   No resolved problems to display.     1.  Acute HFPEF- continue IV diuresis today; likely will be able to transition to oral tomorrow.  Perhaps switch to torsemide as OP diuretic  2. CRI, stage III  3. SSS, s/p PM  4. HTN- follow  5. Anemia- follow      Gael Lubin III, MD  07/03/17  8:46 AM

## 2017-07-03 NOTE — PROGRESS NOTES
Discharge Planning Assessment  [facility]     Patient Name: Nehemias Lizama  MRN: [id number]  Today's Date: 7/3/2017    Admit Date: 7/2/2017          Discharge Needs Assessment       07/03/17 1550    Living Environment    Lives With alone    Living Arrangements independent living facility   [facility]    Home Accessibility no concerns    Stair Railings at Home none    Type of Financial/Environmental Concern none    Transportation Available car;family or friend will provide    Living Environment    Provides Primary Care For no one    Quality Of Family Relationships supportive    Able to Return to Prior Living Arrangements yes    Discharge Needs Assessment    Concerns To Be Addressed basic needs concerns    Readmission Within The Last 30 Days no previous admission in last 30 days    Outpatient/Agency/Support Group Needs skilled nursing facility (specify);homecare agency (specify level of care)    Anticipated Changes Related to Illness inability to care for self    Equipment Currently Used at Home cane, straight;grab bar;bath bench;oxygen;respiratory supplies   CPAP with 4L oxygen at night from [facility], nebulizer for pulmonary HTN    Equipment Needed After Discharge none    Discharge Facility/Level Of Care Needs home with home health;nursing facility, skilled    Discharge Disposition home healthcare service;skilled nursing facility            Discharge Plan       07/03/17 1553    Case Management/Social Work Plan    Plan Lives at [facility]- agreeable to [facility] skilled if needed.     Patient/Family In Agreement With Plan yes   Jacki Snow([facility])  [phone]    Additional Comments Introduced self and explained role of CCP, IMM checked and facesheet verified with patient, who is alert and oriented x 4, and Jacki BASHIR at bedside with patient's permission.  Patient states she lives at [facility] and is independent with bathing, ambulating with cane,  medications and eating, and states [facility] provides meals and housekeeping and [facility] provides transportation when needed..  Patient states she uses [facility] on [address] [facility] [address], [place], [place] for local pharmacy and [facility] for long term medications.  Patient states she has a straight cane, bath bench with grab bars, CPAP with 4L oxygen supplied from [facility] and has a nebulizer for Pulmonary HTN and denies DME needs.  Patient states she hopes to return to her apartment at [facility], but is agreeable to go to [facility](Luna notified at [phone]) for rehab if she needs it and Jacki, states she will provide transportation at discharge.... CCP will continue to follow and assist as needed.....Aline Corral RN,CCP         Discharge Placement     Facility/Agency Request Status Selected? Address Phone Number Fax Number    [facility] [place] Pending - Request Sent     [address] , [facility] [address] [phone]                Demographic Summary       07/03/17 1548    Referral Information    Admission Type inpatient    Arrived From other (see comments)   [facility]    Contact Information    Permission Granted to Share Information With family/designee   Jacki Snow([facility]) [phone]    Primary Care Physician Information    Name Gurpreet Maya MD            Functional Status       07/03/17 1550    Functional Status Current    Ambulation 1-->assistive equipment    Transferring 0-->independent    Toileting 2-->assistive person    Bathing 2-->assistive person    Dressing 0-->independent    Eating 0-->independent    Communication 0-->understands/communicates without difficulty    Change in Functional Status Since Onset of Current Illness/Injury yes    Functional Status Prior    Ambulation 1-->assistive equipment    Transferring 0-->independent    Toileting 0-->independent    Bathing 0-->independent    Dressing 0-->independent    Eating 0-->independent    IADL     Medications independent    Meal Preparation assistive person    Housekeeping assistive person    Laundry assistive person    Shopping assistive person    Oral Care independent    Activity Tolerance    Current Activity Limitations none    Usual Activity Tolerance moderate    Current Activity Tolerance fair    Cognitive/Perceptual/Developmental    Current Mental Status/Cognitive Functioning no deficits noted    Recent Changes in Mental Status/Cognitive Functioning no changes    Developmental Stage (Eriksson's Stages of Development) Stage 8 (65 years-death/Late Adulthood) Integrity vs. Despair            Psychosocial     None            Abuse/Neglect     None            Legal     None            Substance Abuse     None            Patient Forms     None          Aline Corral RN

## 2017-07-03 NOTE — H&P
Internal medicine history and physical    INTERNAL MEDICINE   [facility]       Patient Identification:  Name: Nehemias Lizama  Age: 89 y.o.  Sex: female  :  1926  MRN: [id number]                   Primary Care Physician: Gurpreet Maya MD                                   Chief Complaint: Significant congestion in the head along with episodes of shortness of breath getting worse since last 2 weeks.    History of Present Illness:   Patient is a 89-year-old female who has complicated past medical history has been battling with head congestion and associated ringing in the ear and not feeling very well.  About 2 weeks ago she was seen by her nephrologist and her Lasix was changed and decreased to 3 times a week and hydralazine was increased.  According to her she's been noticing increasing shortness of breath since then and the last 24 hours she had a hard time laying flat and had to use 2 pillows to get through the night.  This morning she was significantly short of breath resulting in her coming to the emergency room for further evaluation.  She denies other symptoms such as chest pain abdominal pain nausea vomiting diarrhea but does admit to have dizziness upon standing up.  She has preserved appetite and uses CPAP machine for underlying sleep apnea.  Patient was recently seen in the emergency room on 2017 shortness of breath and congestive heart failure and was noted to have digitalis toxicity at that time.  Her creatinine was 1.8 at that time.    Past Medical History:  Past Medical History:   Diagnosis Date   • Anemia    • Arthritis    • CHF (congestive heart failure)    • Depression    • Diverticulosis    • GERD (gastroesophageal reflux disease)    • Hyperlipidemia    • Hypertension    • Hypothyroidism    • Neuromuscular disorder    • Urinary tract infection      Past Surgical History:  History reviewed. No pertinent surgical history.   Home Meds:  Prescriptions Prior to  Admission   Medication Sig Dispense Refill Last Dose   • amiodarone (PACERONE) 200 MG tablet Take  by mouth.      • aspirin 81 MG chewable tablet Chew 81 mg Daily.      • bosentan (TRACLEER) 125 MG tablet Take  by mouth 2 (two) times a day.      • cholecalciferol (VITAMIN D3) 1000 UNITS tablet Take 2,000 Units by mouth Daily.      • digoxin (LANOXIN) 125 MCG tablet Take 125 mcg by mouth Every Other Day.      • furosemide (LASIX) 40 MG tablet TAKE ONE AND ONE-HALF TABLETS DAILY (Patient taking differently: takes 1 tablet every other day) 135 tablet 1    • hydrALAZINE (APRESOLINE) 50 MG tablet TAKE 1 TABLET THREE TIMES A  tablet 0    • Iloprost 20 MCG/ML solution Ventavis SOLN; Patient Sig: Ventavis SOLN ; 0; 04-Sep-2013; Active      • levothyroxine (SYNTHROID, LEVOTHROID) 88 MCG tablet TAKE 1 TABLET DAILY (Patient taking differently: TAKE 100mg DAILY) 90 tablet 0    • omeprazole (priLOSEC) 20 MG capsule TAKE 1 CAPSULE DAILY 90 capsule 1    • PARoxetine (PAXIL) 40 MG tablet TAKE 1 TABLET DAILY 90 tablet 0    • potassium chloride (K-DUR,KLOR-CON) 20 MEQ CR tablet TAKE 1 TABLET DAILY (Patient taking differently: pt takes 10meq daily) 90 tablet 0    • quinapril (ACCUPRIL) 10 MG tablet Take 20 mg by mouth 2 (Two) Times a Day.      • Artificial Saliva (MOUTH KOTE MT) Mouth Kote Mouth/Throat Solution; Patient Sig: Mouth Kote Mouth/Throat Solution USE AS DIRECTED AS A SALIVA SUBSTITUTE; 12; 0; 01-May-2013; Active      • mirtazapine (REMERON) 30 MG tablet Take  by mouth.      • montelukast (SINGULAIR) 10 MG tablet Take  by mouth daily.        Current Meds:     Current Facility-Administered Medications:   •  bumetanide (BUMEX) 10 mg in sodium chloride 0.9 % 100 mL (0.1 mg/mL) infusion, 0.5 mg/hr, Intravenous, Continuous, Moy Ram MD, Last Rate: 5 mL/hr at 07/02/17 1920, 0.5 mg/hr at 07/02/17 1920  •  sodium chloride 0.9 % flush 10 mL, 10 mL, Intravenous, PRN, Moy Ram MD  Allergies:  Allergies   Allergen  "Reactions   • Amlodipine Besy-Benazepril Hcl    • Metoclopramide    • Nisoldipine      Social History:   Social History   Substance Use Topics   • Smoking status: Former Smoker   • Smokeless tobacco: Not on file   • Alcohol use No      Family History:  Family History   Problem Relation Age of Onset   • Coronary artery disease Other    • Hypertension Other    • Hyperlipidemia Other           Review of Systems  See history of present illness and past medical history.  Constitutional: Negative for chills.   HENT: Negative for congestion, rhinorrhea and sore throat.   Eyes: Negative for pain.   Respiratory: Positive for shortness of breath. Negative for cough.   Cardiovascular: Negative for chest pain, palpitations and leg swelling.   Gastrointestinal: Negative for abdominal pain, diarrhea, nausea and vomiting.   Genitourinary: Negative for difficulty urinating, dysuria, flank pain and frequency.   Musculoskeletal: Negative for neck stiffness.   \"pressure\" to the head and neck   Skin: Negative for rash.   Neurological: Positive for light-headedness (mild). Negative for speech difficulty, weakness and numbness.   Psychiatric/Behavioral: Negative.   Vitals:   /70 (BP Location: Left arm, Patient Position: Lying) Comment: sal notified  Pulse 60  Temp 98.4 °F (36.9 °C) (Oral)   Resp 16  Ht 60\" (152.4 cm)  Wt 131 lb 8 oz (59.6 kg)  SpO2 96%  BMI 25.68 kg/m2  I/O:   Intake/Output Summary (Last 24 hours) at 07/02/17 2147  Last data filed at 07/02/17 2146   Gross per 24 hour   Intake                0 ml   Output              550 ml   Net             -550 ml     Exam:  General Appearance:    Alert, cooperative, no distress, appears stated age   Head:    Normocephalic, without obvious abnormality, atraumatic   Eyes:    PERRL, conjunctiva/corneas clear, EOM's intact, both eyes   Ears:    Normal external ear canals, both ears   Nose:   Nares normal, septum midline, mucosa normal, no drainage    or sinus tenderness "   Throat:   Lips, tongue, gums normal; oral mucosa pink and moist   Neck:   Supple, symmetrical, trachea midline, no adenopathy;     thyroid:  no enlargement/tenderness/nodules; no carotid    bruit or JVD   Back:     Symmetric, no curvature, ROM normal, no CVA tenderness   Lungs:     Clear to auscultation bilaterally, respirations unlabored   Chest Wall:    No tenderness or deformity    Heart:    Regular rate and rhythm, S1 and S2 normal, no murmur, rub   or gallop   Abdomen:     Soft, non-tender, bowel sounds active all four quadrants,     no masses, no hepatomegaly, no splenomegaly   Extremities:   Extremities normal, atraumatic, no cyanosis or edema   Pulses:   Pulses palpable in all extremities; symmetric all extremities   Skin:   Skin color normal, Skin is warm and dry,  no rashes or palpable lesions   Neurologic:   CNII-XII intact, motor strength grossly intact, sensation grossly intact to light touch, no focal deficits noted       Data Review:      I reviewed the patient's new clinical results.    Results from last 7 days  Lab Units 07/02/17  1707   WBC 10*3/mm3 13.86*   HEMOGLOBIN g/dL 9.8*   PLATELETS 10*3/mm3 212       Results from last 7 days  Lab Units 07/02/17  1707   SODIUM mmol/L 141   POTASSIUM mmol/L 4.3   CHLORIDE mmol/L 101   CO2 mmol/L 23.2   BUN mg/dL 26*   CREATININE mg/dL 1.65*   CALCIUM mg/dL 10.6*   GLUCOSE mg/dL 146*       Assessment:  Principal Problem:    Congestive heart failure  Active Problems:    Anemia    Atrial fibrillation    Chronic kidney disease, stage III (moderate)    Hypertension    Hypothyroidism  Hypercalcemia    Plan:  Admit the patient, diurese her with Bumex 1 mg every 12 hours until seen by nephrology and cardiology service, started on Claritin or Zyrtec for sinus congestion and further management as her condition evolves.  monitor her blood pressure, Nephrology evaluation and further management as her condition evolves    Artem Valles MD   7/2/2017  9:47 PM  Much of  this encounter note is an electronic transcription/translation of spoken language to printed text. The electronic translation of spoken language may permit erroneous, or at times, nonsensical words or phrases to be inadvertently transcribed; Although I have reviewed the note for such errors, some may still exist

## 2017-07-03 NOTE — PROGRESS NOTES
Name: Nehemias Lizama ADMIT: 2017   : 1926  PCP: Gurpreet Maya MD    MRN: [id number] LOS: 1 days   AGE/SEX: 89 y.o. female  ROOM: [facility]/   Subjective   Subjective  Cc- dyspnea    Dyspnea a little better  LE edema improved  On IV diuretics  Sees Dr. Barajas as outpatient and had been on po diuretics  States would like to be DNR/DNI    ROS  No f/c  No n/v  No cp/palp  No cough, +soa  Objective   Vital Signs  Temp:  [97.9 °F (36.6 °C)-98.4 °F (36.9 °C)] 98.2 °F (36.8 °C)  Heart Rate:  [60] 60  Resp:  [16-17] 17  BP: (150-198)/(50-74) 150/50  SpO2:  [90 %-98 %] 98 %  on  Flow (L/min):  [2] 2;   O2 Device: nasal cannula  Body mass index is 25.62 kg/(m^2).    Alert, elderly  +jvd  RRR, +murmur  Soft, nt  No resp distress, decreased bs at bases  Soft, nt  Trace LE edema  A and O x 3, pleasant    Physical Exam    Results Review:       I reviewed the patient's new clinical results.    Results from last 7 days  Lab Units 17  0306 17  1707   WBC 10*3/mm3 9.14 13.86*   HEMOGLOBIN g/dL 8.9* 9.8*   PLATELETS 10*3/mm3 174 212       Results from last 7 days  Lab Units 17  0306 17  1707   SODIUM mmol/L 142 141   POTASSIUM mmol/L 3.5 4.3   CHLORIDE mmol/L 101 101   CO2 mmol/L 26.0 23.2   BUN mg/dL 23 26*   CREATININE mg/dL 1.50* 1.65*   GLUCOSE mg/dL 116* 146*   Estimated Creatinine Clearance: 19.7 mL/min (by C-G formula based on Cr of 1.5).    Results from last 7 days  Lab Units 17  0306 17  1707   CALCIUM mg/dL 9.8* 10.6*   ALBUMIN g/dL 4.00 4.60         amiodarone 200 mg Oral Q24H   aspirin 81 mg Oral Daily   bumetanide 1 mg Intravenous Q12H   cholecalciferol 2,000 Units Oral Daily   digoxin 125 mcg Oral Every Other Day   heparin (porcine) 5,000 Units Subcutaneous Q12H   hydrALAZINE 50 mg Oral Q12H   Iloprost 1 vial Nebulization 4x Daily - RT   levothyroxine 88 mcg Oral Q AM   pantoprazole 40 mg Oral Q AM   PARoxetine 40 mg Oral Daily   potassium chloride 20 mEq Oral Daily    quinapril 20 mg Oral BID      Diet Regular; Thin; Cardiac      Assessment/Plan   Assessment:     Active Hospital Problems (** Indicates Principal Problem)    Diagnosis Date Noted   • **Acute on chronic diastolic congestive heart failure [I50.33] 03/28/2016   • DNR (do not resuscitate) [Z66] 07/03/2017   • Anemia [D64.9] 03/28/2016   • Atrial fibrillation [I48.91] 03/28/2016   • Chronic kidney disease, stage III (moderate) [N18.3] 03/28/2016   • Hypertension [I10] 03/28/2016   • Hypothyroidism [E03.9] 03/28/2016   • Pulmonary hypertension [I27.2] 03/28/2016      Resolved Hospital Problems    Diagnosis Date Noted Date Resolved   No resolved problems to display.       Plan:   - Continue IV diuretics under direction of Cardiology  - D/W Family and Dr. Barajas. Cardiology already managing diuretics and Dr. Barajas not in hospital today so will hold off on renal consult.  - Add medications for her Pulmonary hypertension  -DNR/DNI- d/w pt and will order   - Monitor renal fx    D/W RN, family, Dr. Barajas, reviewed previous records    Disposition  TBD.      Alen Durham MD  [facility]  07/03/17  12:59 PM

## 2017-07-03 NOTE — DISCHARGE PLACEMENT REQUEST
"Nehemias Lizama (89 y.o. Female)     Date of Birth Social Security Number Address Home Phone MRN    06/17/1926  [address] [place] [address] [phone] [id number]    [place] Marital Status          None        Admission Date Admission Type Admitting Provider Attending Provider Department, Room/Bed    7/2/17 Emergency Artem Valles MD [place], Alen Motley MD [address], 401/1    Discharge Date Discharge Disposition Discharge Destination                      Attending Provider: Alen Durham MD     Allergies:  Amlodipine Besy-benazepril Hcl, Metoclopramide, Nisoldipine    Isolation:  None   Infection:  None   Code Status:  Conditional    Ht:  60\" (152.4 cm)   Wt:  131 lb 3.2 oz (59.5 kg)    Admission Cmt:  None   Principal Problem:  Acute on chronic diastolic congestive heart failure [I50.33]                 Active Insurance as of 7/2/2017     Primary Coverage     Payor Plan Insurance Group Employer/Plan Group    [facility] MEDICARE REPLACEMENT [employer] [id number]     Payor Plan Address Payor Plan Phone Number Effective From Effective To    [facility] [phone] 1/1/2016     [id number]       [place] [address]       Subscriber Name Subscriber Birth Date Member ID       NEHEMIAS LIZAMA 6/17/1926 [id number]                 Emergency Contacts      (Rel.) Home Phone Work Phone Mobile Phone    Jacki Snow (Relative) [phone] -- --              "

## 2017-07-03 NOTE — PLAN OF CARE
Problem: Patient Care Overview (Adult)  Goal: Plan of Care Review  Outcome: Ongoing (interventions implemented as appropriate)    07/03/17 0346 07/03/17 1200 07/03/17 1305   Coping/Psychosocial Response Interventions   Plan Of Care Reviewed With --  patient;family  (Niece) --    Patient Care Overview   Progress improving --  --    Outcome Evaluation   Outcome Summary/Follow up Plan --  --  Pt. dyspnea, lung sounds & leg edema much improved; Bumex drip discontinued, IV Bumex started twice a day; family brought home med - Ventavis inhaler which was sent to pharmacy for verification. Pt. family also to bring CPAP for nightly use as well as Tracleer tablets. Dr. Durham approved the use of these. Pt. alert and oriented x 4 and vitals stable.       Goal: Adult Individualization and Mutuality  Outcome: Ongoing (interventions implemented as appropriate)  Goal: Discharge Needs Assessment  Outcome: Ongoing (interventions implemented as appropriate)    Problem: Cardiac: Heart Failure (Adult)  Goal: Signs and Symptoms of Listed Potential Problems Will be Absent or Manageable (Cardiac: Heart Failure)  Outcome: Ongoing (interventions implemented as appropriate)    Problem: Fall Risk (Adult)  Goal: Absence of Falls  Outcome: Ongoing (interventions implemented as appropriate)

## 2017-07-03 NOTE — PLAN OF CARE
Problem: Patient Care Overview (Adult)  Goal: Plan of Care Review  Outcome: Ongoing (interventions implemented as appropriate)    07/03/17 0346   Coping/Psychosocial Response Interventions   Plan Of Care Reviewed With patient;durable power of    Patient Care Overview   Progress improving   Outcome Evaluation   Outcome Summary/Follow up Plan Pt admitted from ER with Acute CHF, on bumex gtt w/good diuresis. up to [facility] w/assist. HTN, home meds continued. Cardiology to see. AAO. Will closely monitor.         Problem: Cardiac: Heart Failure (Adult)  Goal: Signs and Symptoms of Listed Potential Problems Will be Absent or Manageable (Cardiac: Heart Failure)  Outcome: Ongoing (interventions implemented as appropriate)    Problem: Fall Risk (Adult)  Goal: Identify Related Risk Factors and Signs and Symptoms  Outcome: Outcome(s) achieved Date Met:  07/03/17  Goal: Absence of Falls  Outcome: Ongoing (interventions implemented as appropriate)

## 2017-07-04 LAB
ALBUMIN SERPL-MCNC: 3.4 G/DL (ref 3.5–5.2)
ANION GAP SERPL CALCULATED.3IONS-SCNC: 12.4 MMOL/L
BUN BLD-MCNC: 24 MG/DL (ref 8–23)
BUN/CREAT SERPL: 14.7 (ref 7–25)
CALCIUM SPEC-SCNC: 9.7 MG/DL (ref 8.2–9.6)
CHLORIDE SERPL-SCNC: 101 MMOL/L (ref 98–107)
CO2 SERPL-SCNC: 28.6 MMOL/L (ref 22–29)
CREAT BLD-MCNC: 1.63 MG/DL (ref 0.57–1)
GFR SERPL CREATININE-BSD FRML MDRD: 30 ML/MIN/1.73
GLUCOSE BLD-MCNC: 98 MG/DL (ref 65–99)
PHOSPHATE SERPL-MCNC: 3.6 MG/DL (ref 2.5–4.5)
POTASSIUM BLD-SCNC: 4.2 MMOL/L (ref 3.5–5.2)
SODIUM BLD-SCNC: 142 MMOL/L (ref 136–145)

## 2017-07-04 PROCEDURE — G8978 MOBILITY CURRENT STATUS: HCPCS

## 2017-07-04 PROCEDURE — 25010000002 HEPARIN (PORCINE) PER 1000 UNITS: Performed by: INTERNAL MEDICINE

## 2017-07-04 PROCEDURE — 96376 TX/PRO/DX INJ SAME DRUG ADON: CPT

## 2017-07-04 PROCEDURE — 96372 THER/PROPH/DIAG INJ SC/IM: CPT

## 2017-07-04 PROCEDURE — 97161 PT EVAL LOW COMPLEX 20 MIN: CPT

## 2017-07-04 PROCEDURE — G8979 MOBILITY GOAL STATUS: HCPCS

## 2017-07-04 PROCEDURE — G0378 HOSPITAL OBSERVATION PER HR: HCPCS

## 2017-07-04 PROCEDURE — 99214 OFFICE O/P EST MOD 30 MIN: CPT | Performed by: INTERNAL MEDICINE

## 2017-07-04 PROCEDURE — 80069 RENAL FUNCTION PANEL: CPT | Performed by: INTERNAL MEDICINE

## 2017-07-04 RX ORDER — FUROSEMIDE 40 MG/1
40 TABLET ORAL DAILY
Status: DISCONTINUED | OUTPATIENT
Start: 2017-07-04 | End: 2017-07-05 | Stop reason: HOSPADM

## 2017-07-04 RX ORDER — SIMETHICONE 80 MG
80 TABLET,CHEWABLE ORAL 4 TIMES DAILY PRN
Status: DISCONTINUED | OUTPATIENT
Start: 2017-07-04 | End: 2017-07-05 | Stop reason: HOSPADM

## 2017-07-04 RX ADMIN — QUINAPRIL HYDROCHLORIDE 20 MG: 20 TABLET, FILM COATED ORAL at 17:02

## 2017-07-04 RX ADMIN — ASPIRIN 81 MG: 81 TABLET, CHEWABLE ORAL at 08:24

## 2017-07-04 RX ADMIN — BOSENTAN 125 MG: 125 TABLET, FILM COATED ORAL at 08:24

## 2017-07-04 RX ADMIN — LEVOTHYROXINE SODIUM 88 MCG: 88 TABLET ORAL at 06:08

## 2017-07-04 RX ADMIN — VITAMIN D, TAB 1000IU (100/BT) 2000 UNITS: 25 TAB at 08:24

## 2017-07-04 RX ADMIN — PANTOPRAZOLE SODIUM 40 MG: 40 TABLET, DELAYED RELEASE ORAL at 06:08

## 2017-07-04 RX ADMIN — HEPARIN SODIUM 5000 UNITS: 5000 INJECTION, SOLUTION INTRAVENOUS; SUBCUTANEOUS at 08:24

## 2017-07-04 RX ADMIN — HYDRALAZINE HYDROCHLORIDE 50 MG: 50 TABLET, FILM COATED ORAL at 08:23

## 2017-07-04 RX ADMIN — BOSENTAN 125 MG: 125 TABLET, FILM COATED ORAL at 17:02

## 2017-07-04 RX ADMIN — FUROSEMIDE 40 MG: 40 TABLET ORAL at 10:55

## 2017-07-04 RX ADMIN — HEPARIN SODIUM 5000 UNITS: 5000 INJECTION, SOLUTION INTRAVENOUS; SUBCUTANEOUS at 20:22

## 2017-07-04 RX ADMIN — SIMETHICONE CHEW TAB 80 MG 80 MG: 80 TABLET ORAL at 23:59

## 2017-07-04 RX ADMIN — QUINAPRIL HYDROCHLORIDE 20 MG: 20 TABLET, FILM COATED ORAL at 08:23

## 2017-07-04 RX ADMIN — BUMETANIDE 1 MG: 0.25 INJECTION, SOLUTION INTRAMUSCULAR; INTRAVENOUS at 06:08

## 2017-07-04 RX ADMIN — DIGOXIN 125 MCG: 125 TABLET ORAL at 13:01

## 2017-07-04 RX ADMIN — AMIODARONE HYDROCHLORIDE 200 MG: 200 TABLET ORAL at 08:24

## 2017-07-04 RX ADMIN — POTASSIUM CHLORIDE 20 MEQ: 750 CAPSULE, EXTENDED RELEASE ORAL at 08:23

## 2017-07-04 RX ADMIN — PAROXETINE HYDROCHLORIDE 40 MG: 40 TABLET, FILM COATED ORAL at 08:26

## 2017-07-04 RX ADMIN — HYDRALAZINE HYDROCHLORIDE 75 MG: 50 TABLET, FILM COATED ORAL at 20:22

## 2017-07-04 NOTE — PLAN OF CARE
Problem: Patient Care Overview (Adult)  Goal: Plan of Care Review  Outcome: Ongoing (interventions implemented as appropriate)    07/04/17 1240   Coping/Psychosocial Response Interventions   Plan Of Care Reviewed With patient   Outcome Evaluation   Outcome Summary/Follow up Plan Pt presents with decline in general strength, endurance and mobility due to high risk for falls at this time. Pt PTA amb with stc at home but admits to falling in the last month. Pt on RA upon entering the room in supine but desat noted once pt moved to eob :88% on RA. Pt required O2 to amb in hallway . Pt with multiple periods of unsteadiness. Pt would benefit from cont skilled PT for progression toward indep amb with least vs no AD household distances. She will benefit from rollator walker due to need for seat when endurance issues are present or she is increasing distance. Pt normally uses O2 only at night and will hopefully return to O2 not required with amb.

## 2017-07-04 NOTE — PROGRESS NOTES
Name: Nehemias Lizama ADMIT: 2017   : 1926  PCP: Gurpreet Maya MD    MRN: [id number] LOS: 2 days   AGE/SEX: 89 y.o. female  ROOM: [facility]   Subjective   Subjective  Cc- dyspnea    Dyspnea improved  LE edema improved  On diuretics      ROS  No f/c  No n/v  No cough, +soa  Objective   Vital Signs  Temp:  [98.1 °F (36.7 °C)-98.4 °F (36.9 °C)] 98.3 °F (36.8 °C)  Heart Rate:  [60] 60  Resp:  [16-18] 16  BP: (143-152)/(50-68) 150/53  SpO2:  [95 %-98 %] 96 %  on  Flow (L/min):  [2] 2;   O2 Device: room air  Body mass index is 25 kg/(m^2).    Alert, elderly  RRR, +murmur  Soft, nt  No resp distress, decreased bs at bases  Soft, nt  Trace LE edema  A and O x 3, pleasant    Physical Exam    Results Review:       I reviewed the patient's new clinical results.    Results from last 7 days  Lab Units 17  03017  1707   WBC 10*3/mm3 9.14 13.86*   HEMOGLOBIN g/dL 8.9* 9.8*   PLATELETS 10*3/mm3 174 212       Results from last 7 days  Lab Units 17  03117  0306 17  1707   SODIUM mmol/L 142 142 141   POTASSIUM mmol/L 4.2 3.5 4.3   CHLORIDE mmol/L 101 101 101   CO2 mmol/L 28.6 26.0 23.2   BUN mg/dL 24* 23 26*   CREATININE mg/dL 1.63* 1.50* 1.65*   GLUCOSE mg/dL 98 116* 146*   Estimated Creatinine Clearance: 17.9 mL/min (by C-G formula based on Cr of 1.63).    Results from last 7 days  Lab Units 17  03017  1707   CALCIUM mg/dL 9.7* 9.8* 10.6*   ALBUMIN g/dL 3.40* 4.00 4.60   PHOSPHORUS mg/dL 3.6  --   --          amiodarone 200 mg Oral Q24H   aspirin 81 mg Oral Daily   bosentan 125 mg Oral BID   cholecalciferol 2,000 Units Oral Daily   digoxin 125 mcg Oral Every Other Day   furosemide 40 mg Oral Daily   heparin (porcine) 5,000 Units Subcutaneous Q12H   hydrALAZINE 75 mg Oral Q12H   Iloprost 1 vial Nebulization 4x Daily - RT   levothyroxine 88 mcg Oral Q AM   pantoprazole 40 mg Oral Q AM   PARoxetine 40 mg Oral Daily   potassium chloride 20 mEq Oral Daily    quinapril 20 mg Oral BID      Diet Regular; Thin; Cardiac      Assessment/Plan   Assessment:     Active Hospital Problems (** Indicates Principal Problem)    Diagnosis Date Noted   • **Acute on chronic diastolic congestive heart failure [I50.33] 03/28/2016   • DNR (do not resuscitate) [Z66] 07/03/2017   • Anemia [D64.9] 03/28/2016   • Atrial fibrillation [I48.91] 03/28/2016   • Chronic kidney disease, stage III (moderate) [N18.3] 03/28/2016   • Hypertension [I10] 03/28/2016   • Hypothyroidism [E03.9] 03/28/2016   • Pulmonary hypertension [I27.2] 03/28/2016      Resolved Hospital Problems    Diagnosis Date Noted Date Resolved   No resolved problems to display.       Plan:   - Had been on IV diuretics-->now PO lasix under direction of Cardiology. Had been on 3x/week with recent dose change probably will need to be on daily lasix.  - D/W Family and Dr. Barajas on 7/3- Cardiology already managing diuretics and Dr. Barajas not in hospital today so will hold off on renal consult- she can see him as outpatient  - Added home medications for her Pulmonary hypertension  - DNR/DNI- d/w pt   - Monitor renal fx    D/W RN, reviewed previous records    Disposition  Likely to independent living vs rehab on 7/5      Alen Durham MD  [facility]  07/04/17  11:24 AM

## 2017-07-04 NOTE — PLAN OF CARE
Problem: Patient Care Overview (Adult)  Goal: Plan of Care Review  Outcome: Ongoing (interventions implemented as appropriate)    07/04/17 0334 07/04/17 1240 07/04/17 1323   Coping/Psychosocial Response Interventions   Plan Of Care Reviewed With --  patient --    Patient Care Overview   Progress improving --  --    Outcome Evaluation   Outcome Summary/Follow up Plan --  --  vss, lasix iv switched to po, no c/o at this time, possible d/c tomorrow, will continue to monitor       Goal: Adult Individualization and Mutuality  Outcome: Ongoing (interventions implemented as appropriate)  Goal: Discharge Needs Assessment  Outcome: Ongoing (interventions implemented as appropriate)    Problem: Cardiac: Heart Failure (Adult)  Goal: Signs and Symptoms of Listed Potential Problems Will be Absent or Manageable (Cardiac: Heart Failure)  Outcome: Ongoing (interventions implemented as appropriate)    Problem: Fall Risk (Adult)  Goal: Absence of Falls  Outcome: Ongoing (interventions implemented as appropriate)

## 2017-07-04 NOTE — THERAPY EVALUATION
Acute Care - Physical Therapy Initial Evaluation  [facility]     Patient Name: Nehemias Lizama  : 1926  MRN: [id number]  Today's Date: 2017   Onset of Illness/Injury or Date of Surgery Date: 17  Date of Referral to PT: 17  Referring Physician: Herminio      Admit Date: 2017     Visit Dx:    ICD-10-CM ICD-9-CM   1. Acute on chronic combined systolic and diastolic congestive heart failure I50.43 428.43     428.0   2. CKD (chronic kidney disease), stage 3 (moderate) N18.3 585.3   3. Hypoxia R09.02 799.02   4. Essential hypertension I10 401.9   5. Difficulty walking R26.2 719.7     Patient Active Problem List   Diagnosis   • Abdominal bloating   • Left lower quadrant pain   • Left upper quadrant pain   • Allergic rhinitis   • Anemia   • Ankle joint pain   • Atrial fibrillation   • Chronic kidney disease, stage III (moderate)   • Acute on chronic diastolic congestive heart failure   • Carpal tunnel syndrome   • Depression   • Diarrhea   • Diverticulosis of large intestine   • Gastroesophageal reflux disease   • Essential hypertension   • Excessive flatus   • Fatigue   • Abdominal wall hernia   • Hyperlipidemia   • Hypertension   • Hypokalemia   • Hypothyroidism   • Intestinal obstruction   • Impacted cerumen   • Insomnia   • Iron deficiency   • Pain in extremity   • Swelling of limb   • Osteoarthritis of hand   • Osteoarthritis of knee   • Pulmonary hypertension   • Urinary tract infection   • Vitamin D deficiency   • Aptyalism   • Left foot pain   • Medicare annual wellness visit, initial   • Abnormal Romberg test   • LINDER (dyspnea on exertion)   • Fall at home   • Blunt head trauma   • DNR (do not resuscitate)     Past Medical History:   Diagnosis Date   • Anemia    • Arthritis    • CHF (congestive heart failure)    • Depression    • Diverticulosis    • GERD (gastroesophageal reflux disease)    • Hyperlipidemia    • Hypertension    • Hypothyroidism    • Neuromuscular disorder    • Urinary  tract infection      History reviewed. No pertinent surgical history.       PT ASSESSMENT (last 72 hours)      PT Evaluation       07/04/17 1104 07/03/17 1550    Rehab Evaluation    Document Type evaluation  -SV     Subjective Information agree to therapy;complains of;weakness  -SV     Patient Effort, Rehab Treatment good  -SV     Symptoms Noted During/After Treatment fatigue  -SV     Symptoms Noted Comment unsteady with amb, required O2 for amb in hallway  -SV     General Information    Patient Profile Review yes  -SV     Onset of Illness/Injury or Date of Surgery Date 07/02/17  -     Referring Physician Herminio  -     General Observations supine, NAD, RA, monitors  -SV     Precautions/Limitations fall precautions  -SV     Prior Level of Function independent:   using cane in the last 2 weeks, fall in the last   -SV     Equipment Currently Used at Home  cane, straight;grab bar;bath bench;oxygen;respiratory supplies   CPAP with 4L oxygen at night from [employer], nebulizer for pulmonary HTN  -    Plans/Goals Discussed With patient;family  -SV     Living Environment    Lives With  alone  -    Living Arrangements  independent living facility   [facility]  -    Home Accessibility  no concerns  -SM    Stair Railings at Home  none  -SM    Type of Financial/Environmental Concern  none  -SM    Transportation Available  car;family or friend will provide  -    Clinical Impression    Date of Referral to PT 07/03/17  -     PT Diagnosis difficulty walking  -SV     Functional Level At Time Of Evaluation cga  -SV     Patient/Family Goals Statement return to [facility]  -     Criteria for Skilled Therapeutic Interventions Met treatment indicated  -SV     Pathology/Pathophysiology Noted (Describe Specifically for Each System) musculoskeletal  -SV     Rehab Potential good, to achieve stated therapy goals  -SV     Vital Signs    Pre Systolic BP Rehab 150  -SV     Pre Treatment Diastolic BP 53  -SV      Pretreatment Heart Rate (beats/min) 60  -SV     Pre SpO2 (%) 94  -SV     O2 Delivery Pre Treatment room air  -SV     Intra SpO2 (%) 88  -SV     O2 Delivery Intra Treatment room air  -SV     Post SpO2 (%) 92  -SV     O2 Delivery Post Treatment supplemental O2  -SV     Pre Patient Position Supine  -SV     Intra Patient Position Standing  -SV     Post Patient Position Sitting  -SV     Pain Assessment    Pain Assessment No/denies pain  -SV     Vision Assessment/Intervention    Visual Impairment WFL  -SV     Cognitive Assessment/Intervention    Current Cognitive/Communication Assessment functional  -SV     Orientation Status oriented x 4  -SV     Follows Commands/Answers Questions 100% of the time;able to follow single-step instructions  -SV     Personal Safety WNL/WFL  -SV     Personal Safety Interventions nonskid shoes/slippers when out of bed;supervised activity  -SV     ROM (Range of Motion)    General ROM no range of motion deficits identified  -SV     MMT (Manual Muscle Testing)    General MMT Assessment other (see comments)   grossly observed >3/5 with mobility  -SV     Bed Mobility, Assessment/Treatment    Bed Mob, Supine to Sit, [place] independent  -SV     Bed Mob, Sit to Supine, [place] not tested  -SV     Transfer Assessment/Treatment    Transfers, Sit-Stand [place] contact guard assist  -SV     Transfers, Stand-Sit [place] contact guard assist  -SV     Gait Assessment/Treatment    Gait, [place] Level contact guard assist  -SV     Gait, Distance (Feet) 120  -SV     Gait, Gait Deviations magali decreased;decreased heel strike;step length decreased;stride length decreased  -SV     Gait, Safety Issues step length decreased;balance decreased during turns;supplemental O2  -SV     Gait, Impairments strength decreased;impaired balance  -SV     Gait, Comment several periods of unsteadiness noted without AD  -SV     Therapy Exercises    Bilateral Lower Extremities --   Instructed in LAQ with  DF 2-3 reps with 20 count hold  -     Positioning and Restraints    Pre-Treatment Position in bed  -SV     Post Treatment Position chair  -SV     In Chair sitting;call light within reach;with family/caregiver;encouraged to call for assist;with nsg  -       07/02/17 2057 07/02/17 2051    General Information    Equipment Currently Used at Home  cane, straight;bath bench  -JA    Living Environment    Lives With alone  -JA     Living Arrangements independent living facility  -     Home Accessibility no concerns  -     Stair Railings at Home none  -     Type of Financial/Environmental Concern none  -JA     Transportation Available car;family or friend will provide  -JA       User Key  (r) = Recorded By, (t) = Taken By, (c) = Cosigned By    Initials Name Provider Type     Aline Corral, MONAE Case Manager     Anusha Nagy, PT Physical Therapist    CHAPIS Villalobos, RN Registered Nurse          Physical Therapy Education     Title: PT OT SLP Therapies (Active)     Topic: Physical Therapy (Active)     Point: Mobility training (Active)    Learning Progress Summary    Learner Readiness Method Response Comment Documented by Status   Patient Acceptance E,TB,D NR   07/04/17 1111 Active   Family Acceptance E,TB,D NR   07/04/17 1111 Active               Point: Home exercise program (Active)    Learning Progress Summary    Learner Readiness Method Response Comment Documented by Status   Patient Acceptance E,TB,D NR   07/04/17 1111 Active   Family Acceptance E,TB,D NR   07/04/17 1111 Active                      User Key     Initials Effective Dates Name Provider Type Discipline     01/17/16 -  Anusha Nagy, PT Physical Therapist PT                PT Recommendation and Plan  Anticipated Equipment Needs At Discharge: rollator  Anticipated Discharge Disposition: home with home health  PT Frequency: daily  Plan of Care Review  Plan Of Care Reviewed With: patient  Outcome Summary/Follow up Plan: Pt  presents with decline in general strength, endurance and mobility due to high risk for falls at this time. Pt PTA amb with stc at home but admits to falling in the last month. Pt on RA upon entering the room in supine but desat noted once pt moved to eob :88% on RA. Pt required O2 to amb in hallway . Pt with multiple  periods of unsteadiness. Pt would benefit from cont skilled PT for progression toward indep amb with least vs no AD household distances. She will benefit from rollator walker due to need for seat when endurance issues are present or she is increasing distance. Pt normally uses O2 only at night and will hopefully return to O2 not required with amb.           IP PT Goals       07/04/17 1239          Transfer Training PT STG    Transfer Training PT STG, Date Established 07/04/17  -SV      Transfer Training PT STG, Time to Achieve 1 wk  -SV      Transfer Training PT STG, Activity Type bed to chair /chair to bed;sit to stand/stand to sit  -SV      Transfer Training PT STG, [place] Level independent   least vs no Ad  -SV      Gait Training PT STG    Gait Training Goal PT STG, Date Established 07/04/17  -SV      Gait Training Goal PT STG, Time to Achieve 1 wk  -SV      Gait Training Goal PT STG, [place] Level independent  -SV      Gait Training Goal PT STG, Assist Device --   least vs No AD  -SV      Gait Training Goal PT STG, Distance to Achieve 200  -SV        User Key  (r) = Recorded By, (t) = Taken By, (c) = Cosigned By    Initials Name Provider Type    SV Anusha Nagy, PT Physical Therapist                Outcome Measures       07/04/17 1100          How much help from another person do you currently need...    Turning from your back to your side while in flat bed without using bedrails? 4  -SV      Moving from lying on back to sitting on the side of a flat bed without bedrails? 3  -SV      Moving to and from a bed to a chair (including a wheelchair)? 3  -SV      Standing up from a chair  using your arms (e.g., wheelchair, bedside chair)? 3  -SV      Climbing 3-5 steps with a railing? 3  -SV      To walk in hospital room? 3  -SV      AM-PAC 6 Clicks Score 19  -SV      Functional Assessment    Outcome Measure Options AM-PAC 6 Clicks Basic Mobility (PT)  -SV        User Key  (r) = Recorded By, (t) = Taken By, (c) = Cosigned By    Initials Name Provider Type    SV Anusha Nagy, PT Physical Therapist           Time Calculation:         PT Charges       07/04/17 1246          Time Calculation    Start Time 1104  -SV      Stop Time 1130  -SV      Time Calculation (min) 26 min  -SV      PT Received On 07/04/17  -SV      PT - Next Appointment 07/05/17  -SV      PT Goal Re-Cert Due Date 07/11/17  -SV        User Key  (r) = Recorded By, (t) = Taken By, (c) = Cosigned By    Initials Name Provider Type     Anusha Nagy, PT Physical Therapist          Therapy Charges for Today     Code Description Service Date Service Provider Modifiers Qty    95069669467 HC PT EVAL LOW COMPLEXITY 2 7/4/2017 Anusha Nagy, PT GP 1          PT G-Codes  Outcome Measure Options: AM-PAC 6 Clicks Basic Mobility (PT)      Anusha Nagy PT  7/4/2017

## 2017-07-04 NOTE — PROGRESS NOTES
LOS: 2 days   Patient Care Team:  Gurpreet Maya MD as PCP - General  Gurpreet Maya MD as PCP - Family Medicine    Chief Complaint:   chf    Interval History:   Feels better, no cp, breathing is much better, no dizziness, no nausea.       Objective   Vital Signs  Temp:  [98.1 °F (36.7 °C)-98.4 °F (36.9 °C)] 98.3 °F (36.8 °C)  Heart Rate:  [60] 60  Resp:  [16-18] 16  BP: (143-152)/(50-68) 150/53    Intake/Output Summary (Last 24 hours) at 07/04/17 0911  Last data filed at 07/04/17 0846   Gross per 24 hour   Intake              120 ml   Output              400 ml   Net             -280 ml       Comfortable NAD  Neck supple, no JVD or thyromegaly appreciated  S1/S2 RRR, no r/g, 3/6 DANELLE  Lungs CTA B, normal effort  Abdomen S/NT/ND (+) BS, no HSM appreciated  Extremities warm, no clubbing, cyanosis, or edema  No visible or palpable skin lesions  A/Ox4, mood and affect appropriate    Results Review:        Results from last 7 days  Lab Units 07/04/17  0314 07/03/17  0306 07/02/17  1707   SODIUM mmol/L 142 142 141   POTASSIUM mmol/L 4.2 3.5 4.3   CHLORIDE mmol/L 101 101 101   CO2 mmol/L 28.6 26.0 23.2   BUN mg/dL 24* 23 26*   CREATININE mg/dL 1.63* 1.50* 1.65*   GLUCOSE mg/dL 98 116* 146*   CALCIUM mg/dL 9.7* 9.8* 10.6*       Results from last 7 days  Lab Units 07/03/17  0306 07/02/17  1707   TROPONIN T ng/mL <0.010 0.011       Results from last 7 days  Lab Units 07/03/17  0306 07/02/17  1707   WBC 10*3/mm3 9.14 13.86*   HEMOGLOBIN g/dL 8.9* 9.8*   HEMATOCRIT % 28.7* 31.8*   PLATELETS 10*3/mm3 174 212       Results from last 7 days  Lab Units 07/03/17  0306   INR  1.27*       Results from last 7 days  Lab Units 07/03/17  0306   CHOLESTEROL mg/dL 158           Results from last 7 days  Lab Units 07/03/17  0306   CHOLESTEROL mg/dL 158   TRIGLYCERIDES mg/dL 107   HDL CHOL mg/dL 45       I reviewed the patient's new clinical results.  I personally viewed and interpreted the patient's EKG/Telemetry data         Medication Review:     amiodarone 200 mg Oral Q24H   aspirin 81 mg Oral Daily   bosentan 125 mg Oral BID   bumetanide 1 mg Intravenous Q12H   cholecalciferol 2,000 Units Oral Daily   digoxin 125 mcg Oral Every Other Day   heparin (porcine) 5,000 Units Subcutaneous Q12H   hydrALAZINE 50 mg Oral Q12H   Iloprost 1 vial Nebulization 4x Daily - RT   levothyroxine 88 mcg Oral Q AM   pantoprazole 40 mg Oral Q AM   PARoxetine 40 mg Oral Daily   potassium chloride 20 mEq Oral Daily   quinapril 20 mg Oral BID            Assessment/Plan     Principal Problem:    Acute on chronic diastolic congestive heart failure  Active Problems:    Anemia    Atrial fibrillation    Chronic kidney disease, stage III (moderate)    Hypertension    Hypothyroidism    Pulmonary hypertension    DNR (do not resuscitate)    1. Acute HFPEF- continue with po diuresis today; Perhaps switch to torsemide as OP diuretic  2. CRI, stage III  3. SSS, s/p PM  4. HTN- follow  5. Anemia- follow  6. Severe pulmonary HTN.     Change to po - maybe home tomorrow. She was on lasix daily and when it was dropped to 3x/week, that is when she gained fluid, will restart 40mg daily.     Johanna Obando MD  07/04/17  9:11 AM

## 2017-07-04 NOTE — PLAN OF CARE
Problem: Inpatient Physical Therapy  Goal: Transfer Training Goal 1 STG- PT  Outcome: Ongoing (interventions implemented as appropriate)    07/04/17 1239   Transfer Training PT STG   Transfer Training PT STG, Date Established 07/04/17   Transfer Training PT STG, Time to Achieve 1 wk   Transfer Training PT STG, Activity Type bed to chair /chair to bed;sit to stand/stand to sit   Transfer Training PT STG, [place] Level independent  (least vs no Ad)       Goal: Gait Training Goal STG- PT  Outcome: Ongoing (interventions implemented as appropriate)    07/04/17 1239   Gait Training PT STG   Gait Training Goal PT STG, Date Established 07/04/17   Gait Training Goal PT STG, Time to Achieve 1 wk   Gait Training Goal PT STG, [place] Level independent   Gait Training Goal PT STG, Assist Device (least vs No AD)   Gait Training Goal PT STG, Distance to Achieve 200

## 2017-07-05 VITALS
BODY MASS INDEX: 25.13 KG/M2 | WEIGHT: 128 LBS | HEIGHT: 60 IN | HEART RATE: 60 BPM | DIASTOLIC BLOOD PRESSURE: 51 MMHG | OXYGEN SATURATION: 98 % | RESPIRATION RATE: 18 BRPM | TEMPERATURE: 97.6 F | SYSTOLIC BLOOD PRESSURE: 174 MMHG

## 2017-07-05 LAB
ANION GAP SERPL CALCULATED.3IONS-SCNC: 14.7 MMOL/L
BUN BLD-MCNC: 25 MG/DL (ref 8–23)
BUN/CREAT SERPL: 14.8 (ref 7–25)
CALCIUM SPEC-SCNC: 10.2 MG/DL (ref 8.2–9.6)
CHLORIDE SERPL-SCNC: 100 MMOL/L (ref 98–107)
CO2 SERPL-SCNC: 26.3 MMOL/L (ref 22–29)
CREAT BLD-MCNC: 1.69 MG/DL (ref 0.57–1)
GFR SERPL CREATININE-BSD FRML MDRD: 28 ML/MIN/1.73
GLUCOSE BLD-MCNC: 102 MG/DL (ref 65–99)
POTASSIUM BLD-SCNC: 4.1 MMOL/L (ref 3.5–5.2)
SODIUM BLD-SCNC: 141 MMOL/L (ref 136–145)

## 2017-07-05 PROCEDURE — G0378 HOSPITAL OBSERVATION PER HR: HCPCS

## 2017-07-05 PROCEDURE — 99214 OFFICE O/P EST MOD 30 MIN: CPT | Performed by: INTERNAL MEDICINE

## 2017-07-05 PROCEDURE — 97110 THERAPEUTIC EXERCISES: CPT

## 2017-07-05 PROCEDURE — 96372 THER/PROPH/DIAG INJ SC/IM: CPT

## 2017-07-05 PROCEDURE — 80048 BASIC METABOLIC PNL TOTAL CA: CPT | Performed by: INTERNAL MEDICINE

## 2017-07-05 PROCEDURE — 25010000002 HEPARIN (PORCINE) PER 1000 UNITS: Performed by: INTERNAL MEDICINE

## 2017-07-05 RX ORDER — QUINAPRIL 5 1/1
10 TABLET ORAL 2 TIMES DAILY
Status: DISCONTINUED | OUTPATIENT
Start: 2017-07-05 | End: 2017-07-05 | Stop reason: HOSPADM

## 2017-07-05 RX ORDER — HYDRALAZINE HYDROCHLORIDE 50 MG/1
100 TABLET, FILM COATED ORAL EVERY 12 HOURS SCHEDULED
Status: DISCONTINUED | OUTPATIENT
Start: 2017-07-05 | End: 2017-07-05 | Stop reason: HOSPADM

## 2017-07-05 RX ORDER — FUROSEMIDE 40 MG/1
40 TABLET ORAL DAILY
Start: 2017-07-05 | End: 2017-10-21 | Stop reason: SDUPTHER

## 2017-07-05 RX ADMIN — AMIODARONE HYDROCHLORIDE 200 MG: 200 TABLET ORAL at 08:11

## 2017-07-05 RX ADMIN — LEVOTHYROXINE SODIUM 88 MCG: 88 TABLET ORAL at 05:07

## 2017-07-05 RX ADMIN — VITAMIN D, TAB 1000IU (100/BT) 2000 UNITS: 25 TAB at 08:11

## 2017-07-05 RX ADMIN — BOSENTAN 125 MG: 125 TABLET, FILM COATED ORAL at 08:11

## 2017-07-05 RX ADMIN — HEPARIN SODIUM 5000 UNITS: 5000 INJECTION, SOLUTION INTRAVENOUS; SUBCUTANEOUS at 08:12

## 2017-07-05 RX ADMIN — PAROXETINE HYDROCHLORIDE 40 MG: 40 TABLET, FILM COATED ORAL at 08:11

## 2017-07-05 RX ADMIN — FUROSEMIDE 40 MG: 40 TABLET ORAL at 08:11

## 2017-07-05 RX ADMIN — QUINAPRIL HYDROCHLORIDE 10 MG: 20 TABLET, FILM COATED ORAL at 08:11

## 2017-07-05 RX ADMIN — ASPIRIN 81 MG: 81 TABLET, CHEWABLE ORAL at 08:11

## 2017-07-05 RX ADMIN — HYDRALAZINE HYDROCHLORIDE 100 MG: 50 TABLET, FILM COATED ORAL at 08:11

## 2017-07-05 RX ADMIN — PANTOPRAZOLE SODIUM 40 MG: 40 TABLET, DELAYED RELEASE ORAL at 05:07

## 2017-07-05 RX ADMIN — POTASSIUM CHLORIDE 20 MEQ: 750 CAPSULE, EXTENDED RELEASE ORAL at 08:11

## 2017-07-05 NOTE — PLAN OF CARE
Problem: Patient Care Overview (Adult)  Goal: Plan of Care Review  Outcome: Ongoing (interventions implemented as appropriate)    07/05/17 1115   Coping/Psychosocial Response Interventions   Plan Of Care Reviewed With patient   Patient Care Overview   Progress improving   Outcome Evaluation   Outcome Summary/Follow up Plan Home today, rashawn will provide transportation       Goal: Adult Individualization and Mutuality  Outcome: Ongoing (interventions implemented as appropriate)  Goal: Discharge Needs Assessment  Outcome: Ongoing (interventions implemented as appropriate)    Problem: Cardiac: Heart Failure (Adult)  Goal: Signs and Symptoms of Listed Potential Problems Will be Absent or Manageable (Cardiac: Heart Failure)  Outcome: Ongoing (interventions implemented as appropriate)    Problem: Fall Risk (Adult)  Goal: Absence of Falls  Outcome: Ongoing (interventions implemented as appropriate)

## 2017-07-05 NOTE — PROGRESS NOTES
LOS: 3 days   Patient Care Team:  Gurpreet Maya MD as PCP - General  Gurpreet Maya MD as PCP - Family Medicine    Chief Complaint:   f/u chf    Interval History:   No dyspnea, cp, dizziness or nausea.     Objective   Vital Signs  Temp:  [98 °F (36.7 °C)-98.4 °F (36.9 °C)] 98 °F (36.7 °C)  Heart Rate:  [60] 60  Resp:  [16-18] 16  BP: (148-190)/(47-60) 168/47    Intake/Output Summary (Last 24 hours) at 07/05/17 0714  Last data filed at 07/05/17 0433   Gross per 24 hour   Intake              360 ml   Output             2050 ml   Net            -1690 ml       Comfortable NAD  Neck supple, no JVD or thyromegaly appreciated  S1/S2 RRR, no m/r/g  Lungs CTA B, normal effort  Abdomen S/NT/ND (+) BS, no HSM appreciated  Extremities warm, no clubbing, cyanosis, or edema  No visible or palpable skin lesions  A/Ox4, mood and affect appropriate    Results Review:        Results from last 7 days  Lab Units 07/05/17  0419 07/04/17  0314 07/03/17  0306   SODIUM mmol/L 141 142 142   POTASSIUM mmol/L 4.1 4.2 3.5   CHLORIDE mmol/L 100 101 101   CO2 mmol/L 26.3 28.6 26.0   BUN mg/dL 25* 24* 23   CREATININE mg/dL 1.69* 1.63* 1.50*   GLUCOSE mg/dL 102* 98 116*   CALCIUM mg/dL 10.2* 9.7* 9.8*       Results from last 7 days  Lab Units 07/03/17  0306 07/02/17  1707   TROPONIN T ng/mL <0.010 0.011       Results from last 7 days  Lab Units 07/03/17  0306 07/02/17  1707   WBC 10*3/mm3 9.14 13.86*   HEMOGLOBIN g/dL 8.9* 9.8*   HEMATOCRIT % 28.7* 31.8*   PLATELETS 10*3/mm3 174 212       Results from last 7 days  Lab Units 07/03/17  0306   INR  1.27*       Results from last 7 days  Lab Units 07/03/17  0306   CHOLESTEROL mg/dL 158           Results from last 7 days  Lab Units 07/03/17  0306   CHOLESTEROL mg/dL 158   TRIGLYCERIDES mg/dL 107   HDL CHOL mg/dL 45       I reviewed the patient's new clinical results.  I personally viewed and interpreted the patient's EKG/Telemetry data        Medication Review:     amiodarone 200 mg Oral  Q24H   aspirin 81 mg Oral Daily   bosentan 125 mg Oral BID   cholecalciferol 2,000 Units Oral Daily   digoxin 125 mcg Oral Every Other Day   furosemide 40 mg Oral Daily   heparin (porcine) 5,000 Units Subcutaneous Q12H   hydrALAZINE 75 mg Oral Q12H   Iloprost 1 vial Nebulization 4x Daily - RT   levothyroxine 88 mcg Oral Q AM   pantoprazole 40 mg Oral Q AM   PARoxetine 40 mg Oral Daily   potassium chloride 20 mEq Oral Daily   quinapril 20 mg Oral BID            Assessment/Plan     Principal Problem:    Acute on chronic diastolic congestive heart failure  Active Problems:    Anemia    Atrial fibrillation    Chronic kidney disease, stage III (moderate)    Hypertension    Hypothyroidism    Pulmonary hypertension    DNR (do not resuscitate)    1. Acute HFPEF- continue with po diuresis today;   2. CRI, stage III  3. SSS, s/p PM  4. HTN- follow  5. Anemia- follow  6. Severe pulmonary HTN.     Creatinine increasing and BP still high but overall she feels better and looks better.  Maybe could d/c soon with f/u in our office in 1 week.     Johanna Obando MD  07/05/17  7:14 AM

## 2017-07-05 NOTE — PLAN OF CARE
Problem: Patient Care Overview (Adult)  Goal: Plan of Care Review  Outcome: Ongoing (interventions implemented as appropriate)    07/05/17 0136   Coping/Psychosocial Response Interventions   Plan Of Care Reviewed With patient   Patient Care Overview   Progress improving   Outcome Evaluation   Outcome Summary/Follow up Plan on po diuretics now; monitor renal fx; still not feeling great per patient; possibly home to ILF vs SNF today; will monitor progress        Goal: Adult Individualization and Mutuality  Outcome: Ongoing (interventions implemented as appropriate)  Goal: Discharge Needs Assessment  Outcome: Ongoing (interventions implemented as appropriate)    Problem: Cardiac: Heart Failure (Adult)  Goal: Signs and Symptoms of Listed Potential Problems Will be Absent or Manageable (Cardiac: Heart Failure)  Outcome: Ongoing (interventions implemented as appropriate)    Problem: Fall Risk (Adult)  Goal: Absence of Falls  Outcome: Ongoing (interventions implemented as appropriate)

## 2017-07-05 NOTE — THERAPY TREATMENT NOTE
Acute Care - Physical Therapy Treatment Note  [facility]     Patient Name: Nehemias Lizama  : 1926  MRN: [id number]  Today's Date: 2017  Onset of Illness/Injury or Date of Surgery Date: 17  Date of Referral to PT: 17  Referring Physician: Herminio    Admit Date: 2017    Visit Dx:    ICD-10-CM ICD-9-CM   1. Acute on chronic combined systolic and diastolic congestive heart failure I50.43 428.43     428.0   2. CKD (chronic kidney disease), stage 3 (moderate) N18.3 585.3   3. Hypoxia R09.02 799.02   4. Essential hypertension I10 401.9   5. Difficulty walking R26.2 719.7     Patient Active Problem List   Diagnosis   • Abdominal bloating   • Left lower quadrant pain   • Left upper quadrant pain   • Allergic rhinitis   • Anemia   • Ankle joint pain   • Atrial fibrillation   • Chronic kidney disease, stage III (moderate)   • Acute on chronic diastolic congestive heart failure   • Carpal tunnel syndrome   • Depression   • Diarrhea   • Diverticulosis of large intestine   • Gastroesophageal reflux disease   • Essential hypertension   • Excessive flatus   • Fatigue   • Abdominal wall hernia   • Hyperlipidemia   • Hypertension   • Hypokalemia   • Hypothyroidism   • Intestinal obstruction   • Impacted cerumen   • Insomnia   • Iron deficiency   • Pain in extremity   • Swelling of limb   • Osteoarthritis of hand   • Osteoarthritis of knee   • Pulmonary hypertension   • Urinary tract infection   • Vitamin D deficiency   • Aptyalism   • Left foot pain   • Medicare annual wellness visit, initial   • Abnormal Romberg test   • LINDER (dyspnea on exertion)   • Fall at home   • Blunt head trauma   • DNR (do not resuscitate)               Adult Rehabilitation Note       17 1000          Rehab Assessment/Intervention    Discipline physical therapy assistant  -CW      Document Type therapy note (daily note)  -CW      Subjective Information agree to therapy;complains of;fatigue  -CW      Patient Effort, Rehab  Treatment good  -CW      Symptoms Noted During/After Treatment fatigue  -CW      Precautions/Limitations fall precautions  -CW      Recorded by [CW] Jordy Conti      Vital Signs    O2 Delivery Pre Treatment room air  -CW      Recorded by [CW] Jordy Conti      Pain Assessment    Pain Assessment No/denies pain  -CW      Recorded by [CHARU] Jordy Conti      Cognitive Assessment/Intervention    Current Cognitive/Communication Assessment functional  -CW      Orientation Status oriented x 4  -CW      Follows Commands/Answers Questions 100% of the time  -CW      Personal Safety WNL/WFL  -CW      Personal Safety Interventions fall prevention program maintained;gait belt;muscle strengthening facilitated;nonskid shoes/slippers when out of bed  -CW      Recorded by [CW] Jordy Conti      Bed Mobility, Assessment/Treatment    Bed Mob, Supine to Sit, [place] not tested  -CW      Bed Mobility, Comment in chair  -CW      Recorded by [CW] Jordy Conti      Transfer Assessment/Treatment    Transfers, Sit-Stand [place] stand by assist  -CW      Transfers, Stand-Sit [place] stand by assist  -CW      Transfers, Sit-Stand-Sit, Assist Device rolling walker  -CW      Recorded by [CW] Jordy Conti      Gait Assessment/Treatment    Gait, [place] Level contact guard assist  -CW      Gait, Assistive Device rolling walker  -CW      Gait, Distance (Feet) 150  -CW      Gait, Gait Deviations magali decreased;step length decreased;stride length decreased  -CW      Recorded by [CHARU] Jordy Conti      Therapy Exercises    Bilateral Lower Extremities AROM:;10 reps;sitting;ankle pumps/circles;hip flexion;LAQ  -CW      Recorded by [CW] Jordy Conti      Positioning and Restraints    Pre-Treatment Position sitting in chair/recliner  -CW      Post Treatment Position chair  -CW      In Chair notified nsg;reclined;call light within reach;encouraged to call for assist  -CW      Recorded by  [CW] Jordy Conti        User Key  (r) = Recorded By, (t) = Taken By, (c) = Cosigned By    Initials Name Effective Dates    CW Jordy Conti 12/13/16 -                 IP PT Goals       07/04/17 1239          Transfer Training PT STG    Transfer Training PT STG, Date Established 07/04/17  -SV      Transfer Training PT STG, Time to Achieve 1 wk  -SV      Transfer Training PT STG, Activity Type bed to chair /chair to bed;sit to stand/stand to sit  -SV      Transfer Training PT STG, [place] Level independent   least vs no Ad  -SV      Gait Training PT STG    Gait Training Goal PT STG, Date Established 07/04/17  -SV      Gait Training Goal PT STG, Time to Achieve 1 wk  -SV      Gait Training Goal PT STG, [place] Level independent  -SV      Gait Training Goal PT STG, Assist Device --   least vs No AD  -SV      Gait Training Goal PT STG, Distance to Achieve 200  -SV        User Key  (r) = Recorded By, (t) = Taken By, (c) = Cosigned By    Initials Name Provider Type    SV Anusha Nagy, PT Physical Therapist          Physical Therapy Education     Title: PT OT SLP Therapies (Active)     Topic: Physical Therapy (Active)     Point: Mobility training (Active)    Learning Progress Summary    Learner Readiness Method Response Comment Documented by Status   Patient Acceptance E,TB,D [place]   07/05/17 1034 Done    Acceptance E,TB,D NR   07/04/17 1111 Active   Family Acceptance E,TB,D NR   07/04/17 1111 Active               Point: Home exercise program (Active)    Learning Progress Summary    Learner Readiness Method Response Comment Documented by Status   Patient Acceptance E,TB,D [place]   07/05/17 1034 Done    Acceptance E,TB,D NR   07/04/17 1111 Active   Family Acceptance E,TB,D NR   07/04/17 1111 Active                      User Key     Initials Effective Dates Name Provider Type Discipline     01/17/16 -  Anusha Nagy, PT Physical Therapist PT     12/13/16 -  Jordy Conti Physical  Therapy Assistant PT                    PT Recommendation and Plan  Anticipated Equipment Needs At Discharge: rollator  Anticipated Discharge Disposition: home with home health  PT Frequency: daily  Plan of Care Review  Plan Of Care Reviewed With: patient  Progress: improving  Outcome Summary/Follow up Plan: Pt increasing with transfer safety and I as well as amb distance with RWX          Outcome Measures       07/05/17 1000 07/04/17 1100       How much help from another person do you currently need...    Turning from your back to your side while in flat bed without using bedrails? 4  -CW 4  -SV     Moving from lying on back to sitting on the side of a flat bed without bedrails? 3  -CW 3  -SV     Moving to and from a bed to a chair (including a wheelchair)? 3  -CW 3  -SV     Standing up from a chair using your arms (e.g., wheelchair, bedside chair)? 3  -CW 3  -SV     Climbing 3-5 steps with a railing? 3  -CW 3  -SV     To walk in hospital room? 3  -CW 3  -SV     AM-PAC 6 Clicks Score 19  -CW 19  -SV     Functional Assessment    Outcome Measure Options AM-PAC 6 Clicks Basic Mobility (PT)  -CW AM-PAC 6 Clicks Basic Mobility (PT)  -SV       User Key  (r) = Recorded By, (t) = Taken By, (c) = Cosigned By    Initials Name Provider Type    SV Anusha Nagy, PT Physical Therapist    CW Jordy Conti Physical Therapy Assistant           Time Calculation:         PT Charges       07/05/17 1035          Time Calculation    Start Time 1008  -CW      Stop Time 1022  -CW      Time Calculation (min) 14 min  -CW      PT Received On 07/05/17  -CW      PT - Next Appointment 07/06/17  -CW        User Key  (r) = Recorded By, (t) = Taken By, (c) = Cosigned By    Initials Name Provider Type    CW Jordy Conti Physical Therapy Assistant          Therapy Charges for Today     Code Description Service Date Service Provider Modifiers Qty    49525416821 HC PT THER PROC EA 15 MIN 7/5/2017 Jordy Conti GP 1    [id number]   PT THER SUPP EA 15 MIN 7/5/2017 Jordy Conti GP 1          PT G-Codes  Outcome Measure Options: AM-PAC 6 Clicks Basic Mobility (PT)    Jordy Conti  7/5/2017

## 2017-07-05 NOTE — DISCHARGE SUMMARY
[facility]    Date of Discharge:  7/5/2017    PCP: Gurpreet Maya MD    Discharge Diagnosis:   Active Hospital Problems (** Indicates Principal Problem)    Diagnosis Date Noted   • **Acute on chronic diastolic congestive heart failure [I50.33] 03/28/2016   • DNR (do not resuscitate) [Z66] 07/03/2017   • Anemia [D64.9] 03/28/2016   • Atrial fibrillation [I48.91] 03/28/2016   • Chronic kidney disease, stage III (moderate) [N18.3] 03/28/2016   • Hypertension [I10] 03/28/2016   • Hypothyroidism [E03.9] 03/28/2016   • Pulmonary hypertension [I27.2] 03/28/2016      Resolved Hospital Problems    Diagnosis Date Noted Date Resolved   No resolved problems to display.     Procedures Performed       Consulting Physician(s)     Provider Relationship    Joseph Sinha MD Consulting Physician    Gael Lubin III, MD Consulting Physician        Hospital Course  Please see history and physical for details. Patient is a 89 y.o. female initially admitted by my colleague Dr. Valles for increasing shortness of breath.  Patient's diuretics have been adjusted previously down to 3 times per week prior to admission.  Patient's normal cardiologist is Dr. Sinha and [place] cardiology was consulted while here.  Patient was placed on IV diuretics and had adequate diuresis.  She has aspects of chronic kidney disease stage III and her creatinine is tolerated.  By discharge her creatinine is 1.69 with potassium of 4.1.  I've advised her to follow-up with her primary care physician within the next couple weeks and have repeat BMP performed at that juncture.  She's been transitioned back to by mouth diuretics and her Lasix is being continued at 40 mg daily and o2 sats are currently 97% at rest.  Her blood pressure control is labile though her diastolic blood pressures appear very therapeutic at this juncture.  All questions answered to both patient and niece at bedside.   Patient is amenable to the plan to be discharged back to [facility] with additional home health nursing to follow for CHF mgnt.  Patient states she is already enrolled in a physical therapy program at that facility and it's okay for her to resume as previously ordered.  Patient's usual pulmonologist is Dr. Navarro and patient was advised to keep scheduled appointment with both specialist as prescribed prior to this admission.  Discussed with Dr. Obando and digoxin will be discontinued at discharge.    Condition on Discharge: Improved    Temp:  [97.6 °F (36.4 °C)-98.4 °F (36.9 °C)] 97.6 °F (36.4 °C)  Heart Rate:  [60] 60  Resp:  [16-18] 18  BP: (148-190)/(47-90) 174/51  Body mass index is 25 kg/(m^2).    Physical Exam   Constitutional: She appears well-developed and well-nourished.   Neck: No JVD present.   Cardiovascular: Normal rate and regular rhythm.    Pulmonary/Chest: Effort normal and breath sounds normal.   Abdominal: Soft. Bowel sounds are normal.   Skin: Skin is warm and dry.   Psychiatric: She has a normal mood and affect.     Discharge Medications   Dl Nehemias GRANT   Home Medication Instructions MAI:[id number]    Printed on:07/05/17 4973   Medication Information                      amiodarone (PACERONE) 200 MG tablet  Take  by mouth.             Artificial Saliva (MOUTH KOTE MT)  Mouth Kote Mouth/Throat Solution; Patient Sig: Mouth Kote Mouth/Throat Solution USE AS DIRECTED AS A SALIVA SUBSTITUTE; 12; 0; 01-May-2013; Active             aspirin 81 MG chewable tablet  Chew 81 mg Daily.             bosentan (TRACLEER) 125 MG tablet  Take  by mouth 2 (two) times a day.             cholecalciferol (VITAMIN D3) 1000 UNITS tablet  Take 2,000 Units by mouth Daily.             furosemide (LASIX) 40 MG tablet  Take 1 tablet by mouth Daily.             hydrALAZINE (APRESOLINE) 50 MG tablet  TAKE 1 TABLET THREE TIMES A DAY             Iloprost 20 MCG/ML solution  Take 20 mcg/mL by nebulization 4 (Four) Times a  Day. Earnest MATTHEWS; Patient Sig: Earnest MATTHEWS ; 0; 04-Sep-2013; Active             levothyroxine (SYNTHROID, LEVOTHROID) 88 MCG tablet  TAKE 1 TABLET DAILY             mirtazapine (REMERON) 30 MG tablet  Take  by mouth.             montelukast (SINGULAIR) 10 MG tablet  Take  by mouth daily.             omeprazole (priLOSEC) 20 MG capsule  TAKE 1 CAPSULE DAILY             PARoxetine (PAXIL) 40 MG tablet  TAKE 1 TABLET DAILY             potassium chloride (K-DUR,KLOR-CON) 20 MEQ CR tablet  TAKE 1 TABLET DAILY             quinapril (ACCUPRIL) 10 MG tablet  Take 20 mg by mouth 2 (Two) Times a Day.                  Additional Instructions for the Follow-ups that You Need to Schedule     Ambulatory Referral to Home Health    As directed    Face to Face Visit Date:  7/5/2017   Follow-up Provider for Plan of Care?:  I treated the patient in an acute care facility and will not continue treatment after discharge.   Follow-up Provider:  ANDRE MAYA   Reason/Clinical Findings:  pulm htn/diastolic chf   Describe mobility limitations that make leaving home difficult:  taxing effort to leave home   Nursing/Therapeutic Services Requested:  Skilled Nursing   Skilled nursing orders:  CHF management   Frequency:  1 Week 1       Discharge Follow-up with PCP    As directed    Follow Up Details:  pcp 2 weeks, keep already scheduled cards/pulm appts             Follow-up Information     Follow up with ELIZAB[facility] Follow up in 1 week(s).    Specialty:  Nurse Practitioner    [facility] information:    [address] [facility]  [address]  [facility] [id number] [phone]          Follow up with [facility] .    Specialty:  Home Health Services    Contact information:    [address] [facility] [address]  [facility] [address] [phone]        Follow up with Andre Maya MD .    Specialty:  Internal Medicine    Why:  pcp 2 weeks, keep already scheduled cards/pulm appts    Contact information:    [address]  [address] [address]  [phone]          Test Results Pending at Discharge     Grover Mayers MD  07/05/17  11:06 AM    Discharge time spent greater than 30 minutes.

## 2017-07-05 NOTE — PROGRESS NOTES
Continued Stay Note  [facility]     Patient Name: Nehemias Lizama  MRN: [id number]  Today's Date: 7/5/2017    Admit Date: 7/2/2017          Discharge Plan       07/05/17 1212    Case Management/Social Work Plan    Plan [facility] with [facility] to follow    Additional Comments Johanna with [facility] notified at ext. [phone] of patient's discharge today.....Aline Corral RN,CCP     Final Note    Final Note Discharged home with [facility] to follow.                   Discharge Codes       07/05/17 1213    Discharge Codes    Discharge Codes 01  Discharge to home        Expected Discharge Date and Time     Expected Discharge Date Expected Discharge Time    Jul 5, 2017             Aline Corral RN

## 2017-07-05 NOTE — PROGRESS NOTES
Continued Stay Note  [facility]     Patient Name: Nehemias Lizama  MRN: [id number]  Today's Date: 7/5/2017    Admit Date: 7/2/2017          Discharge Plan       07/05/17 1012    Case Management/Social Work Plan    Plan [facility] with [facility] to follow    Patient/Family In Agreement With Plan yes    Additional Comments Spoke with patient, who is alert and oriented x 4, at chairside and she states she plans to return home at [facility] and would like [facility](Johanna notified at ext. [phone]) to follow her  and denies any DME needs.  Patient states she has a CPAP with 4L oxygen supplied from [employer] and nebulizer for Pulm. HTN.  Patient states her niece, Jacki will provide transportation... CCP will continue to follow and assist as needed.....Aline Corral RN,CCP               Discharge Codes     None            Aline Corral RN

## 2017-07-05 NOTE — PLAN OF CARE
Problem: Patient Care Overview (Adult)  Goal: Plan of Care Review  Outcome: Ongoing (interventions implemented as appropriate)    07/05/17 1034   Coping/Psychosocial Response Interventions   Plan Of Care Reviewed With patient   Patient Care Overview   Progress improving   Outcome Evaluation   Outcome Summary/Follow up Plan Pt increasing with transfer safety and I as well as amb distance with RWX

## 2017-07-06 NOTE — PLAN OF CARE
Problem: Inpatient Physical Therapy  Goal: Transfer Training Goal 1 STG- PT  Outcome: Unable to achieve outcome(s) by discharge Date Met:  07/06/17 07/06/17 1439   Transfer Training PT STG   Transfer Training PT STG, Outcome goal not met   Transfer Training PT STG, Reason Goal Not Met discharged from facility       Goal: Gait Training Goal STG- PT  Outcome: Unable to achieve outcome(s) by discharge Date Met:  07/06/17 07/06/17 1439   Gait Training PT STG   Gait Training Goal PT STG, Outcome goal not met   Gait Training Goal PT STG, Reason Goal Not Met discharged from facility

## 2017-07-11 ENCOUNTER — OFFICE VISIT (OUTPATIENT)
Dept: INTERNAL MEDICINE | Facility: CLINIC | Age: 82
End: 2017-07-11

## 2017-07-11 VITALS
OXYGEN SATURATION: 93 % | WEIGHT: 130 LBS | HEART RATE: 60 BPM | DIASTOLIC BLOOD PRESSURE: 52 MMHG | HEIGHT: 60 IN | RESPIRATION RATE: 16 BRPM | TEMPERATURE: 98.3 F | BODY MASS INDEX: 25.52 KG/M2 | SYSTOLIC BLOOD PRESSURE: 147 MMHG

## 2017-07-11 DIAGNOSIS — I10 ESSENTIAL HYPERTENSION: ICD-10-CM

## 2017-07-11 DIAGNOSIS — N18.30 CHRONIC KIDNEY DISEASE, STAGE III (MODERATE) (HCC): ICD-10-CM

## 2017-07-11 DIAGNOSIS — E03.8 OTHER SPECIFIED HYPOTHYROIDISM: ICD-10-CM

## 2017-07-11 DIAGNOSIS — Z09 HOSPITAL DISCHARGE FOLLOW-UP: Primary | ICD-10-CM

## 2017-07-11 DIAGNOSIS — E78.49 OTHER HYPERLIPIDEMIA: ICD-10-CM

## 2017-07-11 DIAGNOSIS — I50.33 ACUTE ON CHRONIC DIASTOLIC CONGESTIVE HEART FAILURE (HCC): ICD-10-CM

## 2017-07-11 PROCEDURE — 99214 OFFICE O/P EST MOD 30 MIN: CPT | Performed by: INTERNAL MEDICINE

## 2017-07-12 LAB
ALBUMIN SERPL-MCNC: 4.3 G/DL (ref 3.5–5.2)
BNP SERPL-MCNC: 232.4 PG/ML (ref 0–100)
BUN SERPL-MCNC: 32 MG/DL (ref 8–23)
BUN/CREAT SERPL: 17.7 (ref 7–25)
CALCIUM SERPL-MCNC: 10.7 MG/DL (ref 8.2–9.6)
CHLORIDE SERPL-SCNC: 103 MMOL/L (ref 98–107)
CO2 SERPL-SCNC: 26 MMOL/L (ref 22–29)
CREAT SERPL-MCNC: 1.81 MG/DL (ref 0.57–1)
GLUCOSE SERPL-MCNC: 110 MG/DL (ref 65–99)
PHOSPHATE SERPL-MCNC: 3.3 MG/DL (ref 2.5–4.5)
POTASSIUM SERPL-SCNC: 4.8 MMOL/L (ref 3.5–5.2)
SODIUM SERPL-SCNC: 144 MMOL/L (ref 136–145)

## 2017-07-12 RX ORDER — DIGOXIN 125 MCG
TABLET ORAL
Qty: 90 TABLET | Refills: 1 | Status: SHIPPED | OUTPATIENT
Start: 2017-07-12 | End: 2018-01-08 | Stop reason: SDUPTHER

## 2017-07-12 RX ORDER — POTASSIUM CHLORIDE 20 MEQ/1
TABLET, EXTENDED RELEASE ORAL
Qty: 90 TABLET | Refills: 1 | Status: SHIPPED | OUTPATIENT
Start: 2017-07-12 | End: 2018-01-08 | Stop reason: SDUPTHER

## 2017-07-23 NOTE — PROGRESS NOTES
Subjective   Nehemias Lizama is a 89 y.o. female.   She is here today for hospital discharge follow-up for acute on chronic diastolic congestive heart failure hypertension hyperlipidemia hypothyroidism chronic kidney disease stage III  History of Present Illness   She is here today for hospital discharge follow-up for acute on chronic diastolic congestive heart failure along with hypertension hyper lipidemia hypothyroidism and chronic kidney disease stage III  The following portions of the patient's history were reviewed and updated as appropriate: allergies, current medications, past family history, past medical history, past social history, past surgical history and problem list.    Review of Systems   All other systems reviewed and are negative.      Objective   Physical Exam   Constitutional: She is oriented to person, place, and time. She appears well-developed and well-nourished. She is cooperative.   HENT:   Head: Normocephalic and atraumatic.   Right Ear: Hearing, tympanic membrane, external ear and ear canal normal.   Left Ear: Hearing, tympanic membrane, external ear and ear canal normal.   Nose: Nose normal.   Mouth/Throat: Uvula is midline, oropharynx is clear and moist and mucous membranes are normal.   Eyes: Conjunctivae, EOM and lids are normal. Pupils are equal, round, and reactive to light.   Neck: Phonation normal. Neck supple. Carotid bruit is not present.   Cardiovascular: Normal rate, regular rhythm and normal heart sounds.  Exam reveals no gallop and no friction rub.    No murmur heard.  Pulmonary/Chest: Effort normal and breath sounds normal. No respiratory distress.   Abdominal: Soft. Bowel sounds are normal. She exhibits no distension and no mass. There is no hepatosplenomegaly. There is no tenderness. There is no rebound and no guarding. No hernia.   Musculoskeletal: She exhibits no edema.   Neurological: She is alert and oriented to person, place, and time. Coordination and gait normal.    Skin: Skin is warm and dry.   Psychiatric: She has a normal mood and affect. Her speech is normal and behavior is normal. Judgment and thought content normal.   Nursing note and vitals reviewed.      Assessment/Plan   Diagnoses and all orders for this visit:    Hospital discharge follow-up  -     Renal Function Panel  -     BNP    Acute on chronic diastolic congestive heart failure  -     Renal Function Panel  -     BNP    Essential hypertension  -     Renal Function Panel  -     BNP    Other hyperlipidemia  -     Renal Function Panel  -     BNP    Other specified hypothyroidism  -     Renal Function Panel  -     BNP    Chronic kidney disease, stage III (moderate)  -     Renal Function Panel  -     BNP      Hospital discharge follow-up doing better now after treatment for the next problem  Acute on chronic diastolic congestive heart failure she has been to medication to relieve her extra fluid and doing better  Hypertension well-controlled on current medication  Hyper lipidemia keep LDL less than 70 with proper diet exercise medication  Hypothyroidism follow TSH free T4  Chronic kidney disease stage III follow closely especially with furosemide or other medication on board

## 2017-07-24 RX ORDER — PAROXETINE HYDROCHLORIDE 40 MG/1
TABLET, FILM COATED ORAL
Qty: 90 TABLET | Refills: 1 | Status: SHIPPED | OUTPATIENT
Start: 2017-07-24 | End: 2018-01-20 | Stop reason: SDUPTHER

## 2017-09-11 RX ORDER — LEVOTHYROXINE SODIUM 88 UG/1
TABLET ORAL
Qty: 90 TABLET | Refills: 0 | Status: SHIPPED | OUTPATIENT
Start: 2017-09-11 | End: 2017-12-10 | Stop reason: SDUPTHER

## 2017-09-11 RX ORDER — HYDRALAZINE HYDROCHLORIDE 50 MG/1
TABLET, FILM COATED ORAL
Qty: 270 TABLET | Refills: 0 | Status: SHIPPED | OUTPATIENT
Start: 2017-09-11 | End: 2017-12-10 | Stop reason: SDUPTHER

## 2017-10-03 ENCOUNTER — OFFICE VISIT (OUTPATIENT)
Dept: INTERNAL MEDICINE | Facility: CLINIC | Age: 82
End: 2017-10-03

## 2017-10-03 VITALS
OXYGEN SATURATION: 95 % | HEART RATE: 58 BPM | TEMPERATURE: 97 F | WEIGHT: 130 LBS | HEIGHT: 60 IN | SYSTOLIC BLOOD PRESSURE: 146 MMHG | DIASTOLIC BLOOD PRESSURE: 62 MMHG | BODY MASS INDEX: 25.52 KG/M2

## 2017-10-03 DIAGNOSIS — I10 ESSENTIAL HYPERTENSION: Primary | ICD-10-CM

## 2017-10-03 DIAGNOSIS — I48.0 PAROXYSMAL ATRIAL FIBRILLATION (HCC): ICD-10-CM

## 2017-10-03 DIAGNOSIS — N18.30 CHRONIC KIDNEY DISEASE, STAGE III (MODERATE) (HCC): ICD-10-CM

## 2017-10-03 DIAGNOSIS — I27.20 PULMONARY HYPERTENSION (HCC): ICD-10-CM

## 2017-10-03 LAB
25(OH)D3+25(OH)D2 SERPL-MCNC: 25.3 NG/ML (ref 30–100)
ALBUMIN SERPL-MCNC: 4.2 G/DL (ref 3.5–5.2)
ALP SERPL-CCNC: 94 U/L (ref 39–117)
ALT SERPL-CCNC: 19 U/L (ref 1–33)
AST SERPL-CCNC: 23 U/L (ref 1–32)
BASOPHILS # BLD AUTO: 0.05 10*3/MM3 (ref 0–0.2)
BASOPHILS NFR BLD AUTO: 0.7 % (ref 0–1.5)
BILIRUB DIRECT SERPL-MCNC: <0.2 MG/DL (ref 0–0.3)
BILIRUB SERPL-MCNC: 0.3 MG/DL (ref 0.1–1.2)
BUN SERPL-MCNC: 28 MG/DL (ref 8–23)
BUN/CREAT SERPL: 16.1 (ref 7–25)
CALCIUM SERPL-MCNC: 10.3 MG/DL (ref 8.2–9.6)
CHLORIDE SERPL-SCNC: 102 MMOL/L (ref 98–107)
CO2 SERPL-SCNC: 26.5 MMOL/L (ref 22–29)
CREAT SERPL-MCNC: 1.74 MG/DL (ref 0.57–1)
EOSINOPHIL # BLD AUTO: 0.33 10*3/MM3 (ref 0–0.7)
EOSINOPHIL NFR BLD AUTO: 4.4 % (ref 0.3–6.2)
ERYTHROCYTE [DISTWIDTH] IN BLOOD BY AUTOMATED COUNT: 16.4 % (ref 11.7–13)
GLUCOSE SERPL-MCNC: 92 MG/DL (ref 65–99)
HCT VFR BLD AUTO: 35.2 % (ref 35.6–45.5)
HGB BLD-MCNC: 10.8 G/DL (ref 11.9–15.5)
IMM GRANULOCYTES # BLD: 0.03 10*3/MM3 (ref 0–0.03)
IMM GRANULOCYTES NFR BLD: 0.4 % (ref 0–0.5)
LYMPHOCYTES # BLD AUTO: 1.34 10*3/MM3 (ref 0.9–4.8)
LYMPHOCYTES NFR BLD AUTO: 18 % (ref 19.6–45.3)
MCH RBC QN AUTO: 28.4 PG (ref 26.9–32)
MCHC RBC AUTO-ENTMCNC: 30.7 G/DL (ref 32.4–36.3)
MCV RBC AUTO: 92.6 FL (ref 80.5–98.2)
MONOCYTES # BLD AUTO: 0.64 10*3/MM3 (ref 0.2–1.2)
MONOCYTES NFR BLD AUTO: 8.6 % (ref 5–12)
NEUTROPHILS # BLD AUTO: 5.06 10*3/MM3 (ref 1.9–8.1)
NEUTROPHILS NFR BLD AUTO: 67.9 % (ref 42.7–76)
PHOSPHATE SERPL-MCNC: 3.6 MG/DL (ref 2.5–4.5)
PLATELET # BLD AUTO: 193 10*3/MM3 (ref 140–500)
POTASSIUM SERPL-SCNC: 4.7 MMOL/L (ref 3.5–5.2)
PROT SERPL-MCNC: 7 G/DL (ref 6–8.5)
RBC # BLD AUTO: 3.8 10*6/MM3 (ref 3.9–5.2)
SODIUM SERPL-SCNC: 143 MMOL/L (ref 136–145)
WBC # BLD AUTO: 7.45 10*3/MM3 (ref 4.5–10.7)

## 2017-10-03 PROCEDURE — 99214 OFFICE O/P EST MOD 30 MIN: CPT | Performed by: INTERNAL MEDICINE

## 2017-10-22 NOTE — PROGRESS NOTES
Subjective   Nehemias Lizama is a 89 y.o. female.   She is here today for hypertension hypertension pulmonary hypertension PAF chronic kidney disease stage III and she has no new complaints and the medicines she is on are stable and she is also on high risk medication  History of Present Illness   She is here today for hypertension pulmonary hypertension PAF chronic kidney disease stage III and she is on high risk medication as well including amiodarone  The following portions of the patient's history were reviewed and updated as appropriate: allergies, current medications, past family history, past medical history, past social history, past surgical history and problem list.    Review of Systems   All other systems reviewed and are negative.      Objective   Physical Exam   Constitutional: She is oriented to person, place, and time. She appears well-developed and well-nourished. She is cooperative.   HENT:   Head: Normocephalic and atraumatic.   Right Ear: Hearing, tympanic membrane, external ear and ear canal normal.   Left Ear: Hearing, tympanic membrane, external ear and ear canal normal.   Nose: Nose normal.   Mouth/Throat: Uvula is midline, oropharynx is clear and moist and mucous membranes are normal.   Eyes: Conjunctivae, EOM and lids are normal. Pupils are equal, round, and reactive to light.   Neck: Phonation normal. Neck supple. Carotid bruit is not present.   Cardiovascular: Normal rate, regular rhythm and normal heart sounds.  Exam reveals no gallop and no friction rub.    No murmur heard.  Pulmonary/Chest: Effort normal and breath sounds normal. No respiratory distress.   Abdominal: Soft. Bowel sounds are normal. She exhibits no distension and no mass. There is no hepatosplenomegaly. There is no tenderness. There is no rebound and no guarding. No hernia.   Musculoskeletal: She exhibits no edema.   Neurological: She is alert and oriented to person, place, and time. Coordination and gait normal.   Skin:  Skin is warm and dry.   Psychiatric: She has a normal mood and affect. Her speech is normal and behavior is normal. Judgment and thought content normal.   Nursing note and vitals reviewed.      Assessment/Plan   Diagnoses and all orders for this visit:    Essential hypertension  -     CBC & Differential  -     Renal Function Panel  -     Hepatic Function Panel  -     Vitamin D 25 Hydroxy    Pulmonary hypertension  -     CBC & Differential  -     Renal Function Panel  -     Hepatic Function Panel  -     Vitamin D 25 Hydroxy    Paroxysmal atrial fibrillation  -     CBC & Differential  -     Renal Function Panel  -     Hepatic Function Panel  -     Vitamin D 25 Hydroxy    Chronic kidney disease, stage III (moderate)  -     CBC & Differential  -     Renal Function Panel  -     Hepatic Function Panel  -     Vitamin D 25 Hydroxy      Hypertension well-controlled on current medication and we will continue current medication  Pulmonary Hypertension continue current inhalers and medication for this  PAF rate controlled and steady and continue current medication no evidence of any strokes this time  Chronic kidney disease stage III follow closely with medications and avoid worsening of renal function and she is on high risk medications as well pulmonary

## 2017-10-23 RX ORDER — FUROSEMIDE 40 MG/1
TABLET ORAL
Qty: 135 TABLET | Refills: 1 | Status: SHIPPED | OUTPATIENT
Start: 2017-10-23 | End: 2018-04-21 | Stop reason: SDUPTHER

## 2017-12-04 RX ORDER — OMEPRAZOLE 20 MG/1
CAPSULE, DELAYED RELEASE ORAL
Qty: 90 CAPSULE | Refills: 1 | Status: SHIPPED | OUTPATIENT
Start: 2017-12-04 | End: 2018-06-02 | Stop reason: SDUPTHER

## 2017-12-11 RX ORDER — HYDRALAZINE HYDROCHLORIDE 50 MG/1
TABLET, FILM COATED ORAL
Qty: 270 TABLET | Refills: 0 | Status: SHIPPED | OUTPATIENT
Start: 2017-12-11 | End: 2018-03-10 | Stop reason: SDUPTHER

## 2017-12-11 RX ORDER — LEVOTHYROXINE SODIUM 88 UG/1
TABLET ORAL
Qty: 90 TABLET | Refills: 0 | Status: SHIPPED | OUTPATIENT
Start: 2017-12-11 | End: 2018-03-10 | Stop reason: SDUPTHER

## 2018-01-08 RX ORDER — POTASSIUM CHLORIDE 20 MEQ/1
TABLET, EXTENDED RELEASE ORAL
Qty: 90 TABLET | Refills: 1 | Status: SHIPPED | OUTPATIENT
Start: 2018-01-08

## 2018-01-08 RX ORDER — DIGOXIN 125 MCG
TABLET ORAL
Qty: 90 TABLET | Refills: 1 | Status: SHIPPED | OUTPATIENT
Start: 2018-01-08

## 2018-01-22 RX ORDER — PAROXETINE HYDROCHLORIDE 40 MG/1
TABLET, FILM COATED ORAL
Qty: 90 TABLET | Refills: 1 | Status: SHIPPED | OUTPATIENT
Start: 2018-01-22

## 2018-03-12 RX ORDER — LEVOTHYROXINE SODIUM 88 UG/1
TABLET ORAL
Qty: 90 TABLET | Refills: 0 | Status: SHIPPED | OUTPATIENT
Start: 2018-03-12 | End: 2018-06-09 | Stop reason: SDUPTHER

## 2018-03-12 RX ORDER — HYDRALAZINE HYDROCHLORIDE 50 MG/1
TABLET, FILM COATED ORAL
Qty: 270 TABLET | Refills: 0 | Status: SHIPPED | OUTPATIENT
Start: 2018-03-12 | End: 2018-06-09 | Stop reason: SDUPTHER

## 2018-04-03 ENCOUNTER — OFFICE VISIT (OUTPATIENT)
Dept: INTERNAL MEDICINE | Facility: CLINIC | Age: 83
End: 2018-04-03

## 2018-04-03 VITALS
HEART RATE: 71 BPM | HEIGHT: 60 IN | SYSTOLIC BLOOD PRESSURE: 157 MMHG | WEIGHT: 134 LBS | DIASTOLIC BLOOD PRESSURE: 66 MMHG | RESPIRATION RATE: 16 BRPM | OXYGEN SATURATION: 87 % | TEMPERATURE: 98.7 F | BODY MASS INDEX: 26.31 KG/M2

## 2018-04-03 DIAGNOSIS — E78.2 MIXED HYPERLIPIDEMIA: ICD-10-CM

## 2018-04-03 DIAGNOSIS — R06.09 DOE (DYSPNEA ON EXERTION): ICD-10-CM

## 2018-04-03 DIAGNOSIS — N18.30 CHRONIC KIDNEY DISEASE, STAGE III (MODERATE) (HCC): ICD-10-CM

## 2018-04-03 DIAGNOSIS — L08.9 INFECTED WOUND: ICD-10-CM

## 2018-04-03 DIAGNOSIS — T14.8XXA INFECTED WOUND: ICD-10-CM

## 2018-04-03 DIAGNOSIS — I50.32 CHRONIC DIASTOLIC HEART FAILURE (HCC): ICD-10-CM

## 2018-04-03 DIAGNOSIS — I10 ESSENTIAL HYPERTENSION: ICD-10-CM

## 2018-04-03 DIAGNOSIS — I27.20 PULMONARY HYPERTENSION (HCC): Primary | ICD-10-CM

## 2018-04-03 PROBLEM — N18.9 CHRONIC KIDNEY DISEASE: Status: ACTIVE | Noted: 2018-04-03

## 2018-04-03 PROBLEM — I34.0 MITRAL REGURGITATION: Status: ACTIVE | Noted: 2018-04-03

## 2018-04-03 PROBLEM — I49.5 SICK SINUS SYNDROME (HCC): Status: ACTIVE | Noted: 2018-04-03

## 2018-04-03 PROBLEM — E07.9 THYROID DISEASE: Status: ACTIVE | Noted: 2018-04-03

## 2018-04-03 PROBLEM — I50.30 DIASTOLIC HEART FAILURE (HCC): Status: ACTIVE | Noted: 2018-04-03

## 2018-04-03 PROBLEM — I48.92 ATRIAL FLUTTER (HCC): Status: ACTIVE | Noted: 2018-04-03

## 2018-04-03 PROCEDURE — 99214 OFFICE O/P EST MOD 30 MIN: CPT | Performed by: INTERNAL MEDICINE

## 2018-04-03 RX ORDER — DOXYCYCLINE HYCLATE 100 MG/1
100 CAPSULE ORAL 2 TIMES DAILY
Qty: 20 CAPSULE | Refills: 0 | Status: SHIPPED | OUTPATIENT
Start: 2018-04-03 | End: 2018-04-03 | Stop reason: SDUPTHER

## 2018-04-03 RX ORDER — BUPROPION HYDROCHLORIDE 75 MG/1
75 TABLET ORAL 2 TIMES DAILY
Qty: 180 TABLET | Refills: 1 | Status: SHIPPED | OUTPATIENT
Start: 2018-04-03 | End: 2018-04-03 | Stop reason: SDUPTHER

## 2018-04-03 RX ORDER — DOXYCYCLINE HYCLATE 100 MG/1
100 CAPSULE ORAL 2 TIMES DAILY
Qty: 20 CAPSULE | Refills: 0 | Status: SHIPPED | OUTPATIENT
Start: 2018-04-03 | End: 2018-06-23 | Stop reason: HOSPADM

## 2018-04-03 RX ORDER — BUPROPION HYDROCHLORIDE 75 MG/1
75 TABLET ORAL 2 TIMES DAILY
Qty: 180 TABLET | Refills: 1 | Status: SHIPPED | OUTPATIENT
Start: 2018-04-03

## 2018-04-04 LAB
ALBUMIN SERPL-MCNC: 4.5 G/DL (ref 3.5–5.2)
ALBUMIN/GLOB SERPL: 1.6 G/DL
ALP SERPL-CCNC: 133 U/L (ref 39–117)
ALT SERPL-CCNC: 17 U/L (ref 1–33)
APPEARANCE UR: CLEAR
AST SERPL-CCNC: 19 U/L (ref 1–32)
BACTERIA #/AREA URNS HPF: NORMAL /HPF
BASOPHILS # BLD AUTO: 0.07 10*3/MM3 (ref 0–0.2)
BASOPHILS NFR BLD AUTO: 0.6 % (ref 0–1.5)
BILIRUB SERPL-MCNC: 0.3 MG/DL (ref 0.1–1.2)
BILIRUB UR QL STRIP: NEGATIVE
BUN SERPL-MCNC: 36 MG/DL (ref 8–23)
BUN/CREAT SERPL: 21.1 (ref 7–25)
CALCIUM SERPL-MCNC: 10.7 MG/DL (ref 8.2–9.6)
CASTS URNS MICRO: NORMAL
CHLORIDE SERPL-SCNC: 102 MMOL/L (ref 98–107)
CO2 SERPL-SCNC: 24.4 MMOL/L (ref 22–29)
COLOR UR: YELLOW
CREAT SERPL-MCNC: 1.71 MG/DL (ref 0.57–1)
EOSINOPHIL # BLD AUTO: 0.29 10*3/MM3 (ref 0–0.7)
EOSINOPHIL NFR BLD AUTO: 2.6 % (ref 0.3–6.2)
EPI CELLS #/AREA URNS HPF: NORMAL /HPF
ERYTHROCYTE [DISTWIDTH] IN BLOOD BY AUTOMATED COUNT: 15.6 % (ref 11.7–13)
GFR SERPLBLD CREATININE-BSD FMLA CKD-EPI: 28 ML/MIN/1.73
GFR SERPLBLD CREATININE-BSD FMLA CKD-EPI: 34 ML/MIN/1.73
GLOBULIN SER CALC-MCNC: 2.8 GM/DL
GLUCOSE SERPL-MCNC: 104 MG/DL (ref 65–99)
GLUCOSE UR QL: NEGATIVE
HCT VFR BLD AUTO: 34.6 % (ref 35.6–45.5)
HGB BLD-MCNC: 10.6 G/DL (ref 11.9–15.5)
HGB UR QL STRIP: NEGATIVE
IMM GRANULOCYTES # BLD: 0.05 10*3/MM3 (ref 0–0.03)
IMM GRANULOCYTES NFR BLD: 0.5 % (ref 0–0.5)
KETONES UR QL STRIP: NEGATIVE
LEUKOCYTE ESTERASE UR QL STRIP: (no result)
LYMPHOCYTES # BLD AUTO: 1.8 10*3/MM3 (ref 0.9–4.8)
LYMPHOCYTES NFR BLD AUTO: 16.4 % (ref 19.6–45.3)
MCH RBC QN AUTO: 29.6 PG (ref 26.9–32)
MCHC RBC AUTO-ENTMCNC: 30.6 G/DL (ref 32.4–36.3)
MCV RBC AUTO: 96.6 FL (ref 80.5–98.2)
MONOCYTES # BLD AUTO: 0.73 10*3/MM3 (ref 0.2–1.2)
MONOCYTES NFR BLD AUTO: 6.6 % (ref 5–12)
NEUTROPHILS # BLD AUTO: 8.05 10*3/MM3 (ref 1.9–8.1)
NEUTROPHILS NFR BLD AUTO: 73.3 % (ref 42.7–76)
NITRITE UR QL STRIP: NEGATIVE
PH UR STRIP: 6 [PH] (ref 5–8)
PLATELET # BLD AUTO: 265 10*3/MM3 (ref 140–500)
POTASSIUM SERPL-SCNC: 4.5 MMOL/L (ref 3.5–5.2)
PROT SERPL-MCNC: 7.3 G/DL (ref 6–8.5)
PROT UR QL STRIP: NEGATIVE
RBC # BLD AUTO: 3.58 10*6/MM3 (ref 3.9–5.2)
RBC #/AREA URNS HPF: NORMAL /HPF
SODIUM SERPL-SCNC: 144 MMOL/L (ref 136–145)
SP GR UR: 1.01 (ref 1–1.03)
T4 FREE SERPL-MCNC: 1.74 NG/DL (ref 0.93–1.7)
TSH SERPL DL<=0.005 MIU/L-ACNC: 2.14 MIU/ML (ref 0.27–4.2)
UROBILINOGEN UR STRIP-MCNC: (no result) MG/DL
WBC # BLD AUTO: 10.99 10*3/MM3 (ref 4.5–10.7)
WBC #/AREA URNS HPF: NORMAL /HPF

## 2018-04-15 NOTE — PROGRESS NOTES
Subjective   Nehemias Lizama is a 89 y.o. female.   She is here to follow-up for pulmonary hypertension along with regular hypertension mixed hyper lipidemia chronic diastolic heart failure chronic kidney disease stage III dyspnea on exertion infected wound or she had a chest wall carcinoma removed  History of Present Illness   She is here to follow-up for pulmonary hypertension which is well-controlled along with regular hypertension which is well-controlled on current medication mixed hyper lipidemia for which she tolerates current medications and chronic diastolic heart failure which appears to be stable and chronic kidney disease stage III which has been stable and LINDER which is worrisome and infected wound on chest wall where she had carcinoma removed recently  The following portions of the patient's history were reviewed and updated as appropriate: allergies, current medications, past family history, past medical history, past social history, past surgical history and problem list.    Review of Systems   Respiratory: Positive for shortness of breath.    Skin:        Infected chest wall wound from carcinoma removed   Psychiatric/Behavioral: The patient is nervous/anxious.    All other systems reviewed and are negative.      Objective   Physical Exam   Constitutional: She is oriented to person, place, and time. She appears well-developed and well-nourished. She is cooperative.   HENT:   Head: Normocephalic and atraumatic.   Right Ear: Hearing, tympanic membrane, external ear and ear canal normal.   Left Ear: Hearing, tympanic membrane, external ear and ear canal normal.   Nose: Nose normal.   Mouth/Throat: Uvula is midline, oropharynx is clear and moist and mucous membranes are normal.   Eyes: Conjunctivae, EOM and lids are normal. Pupils are equal, round, and reactive to light.   Neck: Phonation normal. Neck supple. Carotid bruit is not present.   Cardiovascular: Normal rate, regular rhythm and normal heart  sounds.  Exam reveals no gallop and no friction rub.    No murmur heard.  Pulmonary/Chest: Effort normal and breath sounds normal. No respiratory distress.   Abdominal: Soft. Bowel sounds are normal. She exhibits no distension and no mass. There is no hepatosplenomegaly. There is no tenderness. There is no rebound and no guarding. No hernia.   Musculoskeletal: She exhibits no edema.   Neurological: She is alert and oriented to person, place, and time. Coordination and gait normal.   Skin: Skin is warm and dry.        Psychiatric: Her speech is normal and behavior is normal. Judgment and thought content normal. Her mood appears anxious (she is always anxious over her health every time I have seen her).   Nursing note and vitals reviewed.      Assessment/Plan   Diagnoses and all orders for this visit:    Pulmonary hypertension    Essential hypertension  -     CBC & Differential  -     Comprehensive Metabolic Panel  -     T4, Free  -     TSH  -     Urinalysis With Microscopic - Urine, Clean Catch    Mixed hyperlipidemia  -     CBC & Differential  -     Comprehensive Metabolic Panel  -     T4, Free  -     TSH  -     Urinalysis With Microscopic - Urine, Clean Catch    Chronic diastolic heart failure    Chronic kidney disease, stage III (moderate)    LINDER (dyspnea on exertion)    Infected wound/ chest wall  carcinoma removal    Other orders  -     Discontinue: doxycycline (VIBRAMYCIN) 100 MG capsule; Take 1 capsule by mouth 2 (Two) Times a Day.  -     Discontinue: buPROPion (WELLBUTRIN) 75 MG tablet; Take 1 tablet by mouth 2 (Two) Times a Day.  -     Discontinue: doxycycline (VIBRAMYCIN) 100 MG capsule; Take 1 capsule by mouth 2 (Two) Times a Day.  -     Discontinue: buPROPion (WELLBUTRIN) 75 MG tablet; Take 1 tablet by mouth 2 (Two) Times a Day.  -     doxycycline (VIBRAMYCIN) 100 MG capsule; Take 1 capsule by mouth 2 (Two) Times a Day.  -     buPROPion (WELLBUTRIN) 75 MG tablet; Take 1 tablet by mouth 2 (Two) Times a  Day.  -     Microscopic Examination      Pulmonary hypertension appears stable but her LINDER may be related and we will have her see pulmonary  Hypertension well-controlled on current medication no changes needed  Mixed hyper lipidemia stable on current medication and we will follow liver enzymes as well  Chronic diastolic heart failure no evidence of edema but we will have her follow-up with pulmonary and cardiology  Chronic kidney disease stage III has been stable and we will check labs  Infected wound chest wall carcinoma removal we will provide doxycycline for this

## 2018-04-23 RX ORDER — FUROSEMIDE 40 MG/1
TABLET ORAL
Qty: 135 TABLET | Refills: 1 | Status: SHIPPED | OUTPATIENT
Start: 2018-04-23

## 2018-06-04 RX ORDER — OMEPRAZOLE 20 MG/1
CAPSULE, DELAYED RELEASE ORAL
Qty: 90 CAPSULE | Refills: 1 | Status: SHIPPED | OUTPATIENT
Start: 2018-06-04

## 2018-06-11 RX ORDER — LEVOTHYROXINE SODIUM 88 UG/1
TABLET ORAL
Qty: 90 TABLET | Refills: 0 | Status: SHIPPED | OUTPATIENT
Start: 2018-06-11

## 2018-06-11 RX ORDER — HYDRALAZINE HYDROCHLORIDE 50 MG/1
TABLET, FILM COATED ORAL
Qty: 270 TABLET | Refills: 0 | Status: SHIPPED | OUTPATIENT
Start: 2018-06-11

## 2018-06-21 ENCOUNTER — APPOINTMENT (OUTPATIENT)
Dept: GENERAL RADIOLOGY | Facility: HOSPITAL | Age: 83
End: 2018-06-21

## 2018-06-21 ENCOUNTER — HOSPITAL ENCOUNTER (INPATIENT)
Facility: HOSPITAL | Age: 83
LOS: 2 days | Discharge: HOME | End: 2018-06-23
Attending: EMERGENCY MEDICINE | Admitting: INTERNAL MEDICINE

## 2018-06-21 DIAGNOSIS — I50.9 ACUTE ON CHRONIC CONGESTIVE HEART FAILURE, UNSPECIFIED CONGESTIVE HEART FAILURE TYPE: Primary | ICD-10-CM

## 2018-06-21 DIAGNOSIS — I27.20 PULMONARY HYPERTENSION (HCC): ICD-10-CM

## 2018-06-21 DIAGNOSIS — J96.11 CHRONIC RESPIRATORY FAILURE WITH HYPOXIA (HCC): ICD-10-CM

## 2018-06-21 LAB
ALBUMIN SERPL-MCNC: 4.2 G/DL (ref 3.5–5.2)
ALBUMIN/GLOB SERPL: 1.3 G/DL
ALP SERPL-CCNC: 109 U/L (ref 39–117)
ALT SERPL W P-5'-P-CCNC: 13 U/L (ref 1–33)
ANION GAP SERPL CALCULATED.3IONS-SCNC: 11.7 MMOL/L
AST SERPL-CCNC: 19 U/L (ref 1–32)
BASOPHILS # BLD AUTO: 0.03 10*3/MM3 (ref 0–0.2)
BASOPHILS NFR BLD AUTO: 0.3 % (ref 0–1.5)
BILIRUB SERPL-MCNC: 0.5 MG/DL (ref 0.1–1.2)
BUN BLD-MCNC: 29 MG/DL (ref 8–23)
BUN/CREAT SERPL: 19 (ref 7–25)
CALCIUM SPEC-SCNC: 10.1 MG/DL (ref 8.2–9.6)
CHLORIDE SERPL-SCNC: 103 MMOL/L (ref 98–107)
CO2 SERPL-SCNC: 26.3 MMOL/L (ref 22–29)
CREAT BLD-MCNC: 1.53 MG/DL (ref 0.57–1)
DEPRECATED RDW RBC AUTO: 50.5 FL (ref 37–54)
EOSINOPHIL # BLD AUTO: 0.11 10*3/MM3 (ref 0–0.7)
EOSINOPHIL NFR BLD AUTO: 1 % (ref 0.3–6.2)
ERYTHROCYTE [DISTWIDTH] IN BLOOD BY AUTOMATED COUNT: 15.3 % (ref 11.7–13)
GFR SERPL CREATININE-BSD FRML MDRD: 32 ML/MIN/1.73
GLOBULIN UR ELPH-MCNC: 3.3 GM/DL
GLUCOSE BLD-MCNC: 113 MG/DL (ref 65–99)
HCT VFR BLD AUTO: 30 % (ref 35.6–45.5)
HGB BLD-MCNC: 9.1 G/DL (ref 11.9–15.5)
HOLD SPECIMEN: NORMAL
HOLD SPECIMEN: NORMAL
IMM GRANULOCYTES # BLD: 0.03 10*3/MM3 (ref 0–0.03)
IMM GRANULOCYTES NFR BLD: 0.3 % (ref 0–0.5)
LYMPHOCYTES # BLD AUTO: 0.65 10*3/MM3 (ref 0.9–4.8)
LYMPHOCYTES NFR BLD AUTO: 6.1 % (ref 19.6–45.3)
MCH RBC QN AUTO: 27.4 PG (ref 26.9–32)
MCHC RBC AUTO-ENTMCNC: 30.3 G/DL (ref 32.4–36.3)
MCV RBC AUTO: 90.4 FL (ref 80.5–98.2)
MONOCYTES # BLD AUTO: 0.95 10*3/MM3 (ref 0.2–1.2)
MONOCYTES NFR BLD AUTO: 9 % (ref 5–12)
NEUTROPHILS # BLD AUTO: 8.82 10*3/MM3 (ref 1.9–8.1)
NEUTROPHILS NFR BLD AUTO: 83.3 % (ref 42.7–76)
NRBC BLD MANUAL-RTO: 0 /100 WBC (ref 0–0)
NT-PROBNP SERPL-MCNC: 2533 PG/ML (ref 0–1800)
PLATELET # BLD AUTO: 243 10*3/MM3 (ref 140–500)
PMV BLD AUTO: 10.2 FL (ref 6–12)
POTASSIUM BLD-SCNC: 4 MMOL/L (ref 3.5–5.2)
PROT SERPL-MCNC: 7.5 G/DL (ref 6–8.5)
RBC # BLD AUTO: 3.32 10*6/MM3 (ref 3.9–5.2)
SODIUM BLD-SCNC: 141 MMOL/L (ref 136–145)
TROPONIN T SERPL-MCNC: <0.01 NG/ML (ref 0–0.03)
WBC NRBC COR # BLD: 10.59 10*3/MM3 (ref 4.5–10.7)
WHOLE BLOOD HOLD SPECIMEN: NORMAL
WHOLE BLOOD HOLD SPECIMEN: NORMAL

## 2018-06-21 PROCEDURE — G0378 HOSPITAL OBSERVATION PER HR: HCPCS

## 2018-06-21 PROCEDURE — 80053 COMPREHEN METABOLIC PANEL: CPT | Performed by: EMERGENCY MEDICINE

## 2018-06-21 PROCEDURE — 93005 ELECTROCARDIOGRAM TRACING: CPT | Performed by: EMERGENCY MEDICINE

## 2018-06-21 PROCEDURE — 99284 EMERGENCY DEPT VISIT MOD MDM: CPT

## 2018-06-21 PROCEDURE — 84484 ASSAY OF TROPONIN QUANT: CPT | Performed by: EMERGENCY MEDICINE

## 2018-06-21 PROCEDURE — 71046 X-RAY EXAM CHEST 2 VIEWS: CPT

## 2018-06-21 PROCEDURE — 85025 COMPLETE CBC W/AUTO DIFF WBC: CPT | Performed by: EMERGENCY MEDICINE

## 2018-06-21 PROCEDURE — 25010000002 HEPARIN (PORCINE) PER 1000 UNITS: Performed by: INTERNAL MEDICINE

## 2018-06-21 PROCEDURE — 83880 ASSAY OF NATRIURETIC PEPTIDE: CPT | Performed by: EMERGENCY MEDICINE

## 2018-06-21 PROCEDURE — 93010 ELECTROCARDIOGRAM REPORT: CPT | Performed by: INTERNAL MEDICINE

## 2018-06-21 RX ORDER — SODIUM CHLORIDE 0.9 % (FLUSH) 0.9 %
1-10 SYRINGE (ML) INJECTION AS NEEDED
Status: DISCONTINUED | OUTPATIENT
Start: 2018-06-21 | End: 2018-06-23 | Stop reason: HOSPADM

## 2018-06-21 RX ORDER — HYDRALAZINE HYDROCHLORIDE 50 MG/1
50 TABLET, FILM COATED ORAL 3 TIMES DAILY
Status: DISCONTINUED | OUTPATIENT
Start: 2018-06-21 | End: 2018-06-23 | Stop reason: HOSPADM

## 2018-06-21 RX ORDER — SODIUM CHLORIDE 0.9 % (FLUSH) 0.9 %
10 SYRINGE (ML) INJECTION AS NEEDED
Status: DISCONTINUED | OUTPATIENT
Start: 2018-06-21 | End: 2018-06-23 | Stop reason: HOSPADM

## 2018-06-21 RX ORDER — BUMETANIDE 0.25 MG/ML
2 INJECTION INTRAMUSCULAR; INTRAVENOUS ONCE
Status: COMPLETED | OUTPATIENT
Start: 2018-06-21 | End: 2018-06-21

## 2018-06-21 RX ORDER — NITROGLYCERIN 0.4 MG/1
0.4 TABLET SUBLINGUAL
Status: DISCONTINUED | OUTPATIENT
Start: 2018-06-21 | End: 2018-06-23 | Stop reason: HOSPADM

## 2018-06-21 RX ORDER — ONDANSETRON 2 MG/ML
4 INJECTION INTRAMUSCULAR; INTRAVENOUS EVERY 6 HOURS PRN
Status: DISCONTINUED | OUTPATIENT
Start: 2018-06-21 | End: 2018-06-23 | Stop reason: HOSPADM

## 2018-06-21 RX ORDER — ACETAMINOPHEN 325 MG/1
650 TABLET ORAL EVERY 4 HOURS PRN
Status: DISCONTINUED | OUTPATIENT
Start: 2018-06-21 | End: 2018-06-23 | Stop reason: HOSPADM

## 2018-06-21 RX ORDER — FUROSEMIDE 20 MG/1
20 TABLET ORAL DAILY
COMMUNITY
End: 2018-06-23 | Stop reason: HOSPADM

## 2018-06-21 RX ORDER — BUPROPION HYDROCHLORIDE 75 MG/1
75 TABLET ORAL 2 TIMES DAILY
Status: DISCONTINUED | OUTPATIENT
Start: 2018-06-21 | End: 2018-06-23 | Stop reason: HOSPADM

## 2018-06-21 RX ORDER — ASPIRIN 81 MG/1
81 TABLET, CHEWABLE ORAL DAILY
Status: DISCONTINUED | OUTPATIENT
Start: 2018-06-22 | End: 2018-06-23 | Stop reason: HOSPADM

## 2018-06-21 RX ORDER — FUROSEMIDE 40 MG/1
40 TABLET ORAL DAILY
Status: DISCONTINUED | OUTPATIENT
Start: 2018-06-22 | End: 2018-06-22

## 2018-06-21 RX ORDER — PANTOPRAZOLE SODIUM 40 MG/1
40 TABLET, DELAYED RELEASE ORAL EVERY MORNING
Status: DISCONTINUED | OUTPATIENT
Start: 2018-06-22 | End: 2018-06-23 | Stop reason: HOSPADM

## 2018-06-21 RX ORDER — LISINOPRIL 10 MG/1
10 TABLET ORAL
Status: DISCONTINUED | OUTPATIENT
Start: 2018-06-22 | End: 2018-06-23 | Stop reason: HOSPADM

## 2018-06-21 RX ORDER — LEVOTHYROXINE SODIUM 88 UG/1
88 TABLET ORAL DAILY
Status: DISCONTINUED | OUTPATIENT
Start: 2018-06-22 | End: 2018-06-23 | Stop reason: HOSPADM

## 2018-06-21 RX ORDER — AMIODARONE HYDROCHLORIDE 200 MG/1
200 TABLET ORAL DAILY
Status: DISCONTINUED | OUTPATIENT
Start: 2018-06-22 | End: 2018-06-23 | Stop reason: HOSPADM

## 2018-06-21 RX ORDER — POTASSIUM CHLORIDE 1.5 G/1.77G
20 POWDER, FOR SOLUTION ORAL DAILY
Status: DISCONTINUED | OUTPATIENT
Start: 2018-06-22 | End: 2018-06-23 | Stop reason: HOSPADM

## 2018-06-21 RX ORDER — HEPARIN SODIUM 5000 [USP'U]/ML
5000 INJECTION, SOLUTION INTRAVENOUS; SUBCUTANEOUS EVERY 12 HOURS SCHEDULED
Status: DISCONTINUED | OUTPATIENT
Start: 2018-06-21 | End: 2018-06-23 | Stop reason: HOSPADM

## 2018-06-21 RX ORDER — FUROSEMIDE 40 MG/1
40 TABLET ORAL DAILY
COMMUNITY
End: 2018-06-23 | Stop reason: HOSPADM

## 2018-06-21 RX ORDER — PAROXETINE HYDROCHLORIDE 20 MG/1
40 TABLET, FILM COATED ORAL DAILY
Status: DISCONTINUED | OUTPATIENT
Start: 2018-06-22 | End: 2018-06-23 | Stop reason: HOSPADM

## 2018-06-21 RX ADMIN — BUMETANIDE 2 MG: 0.25 INJECTION INTRAMUSCULAR; INTRAVENOUS at 18:57

## 2018-06-21 RX ADMIN — HEPARIN SODIUM 5000 UNITS: 5000 INJECTION, SOLUTION INTRAVENOUS; SUBCUTANEOUS at 23:58

## 2018-06-21 RX ADMIN — HYDRALAZINE HYDROCHLORIDE 50 MG: 50 TABLET, FILM COATED ORAL at 23:58

## 2018-06-21 RX ADMIN — BUPROPION HYDROCHLORIDE 75 MG: 75 TABLET, FILM COATED ORAL at 23:58

## 2018-06-21 NOTE — ED PROVIDER NOTES
EMERGENCY DEPARTMENT ENCOUNTER    CHIEF COMPLAINT  Chief Complaint: SOA  History given by: Patient, Niece  History limited by: None  Room Number: 11/11  PMD: Gurpreet Maya MD      HPI:  Pt is a 89 y.o. female who [facility] complaining of worsening SOA over the past 3 weeks. Patient is on 4L supplemental O2 at night, but over the past few days has been wearing supplemental O2 during the day due to LINDER. Patient also c/o intermittent RLE pain and dry cough, but there are no c/o CP, leg swelling, weight change, fever, HA, sore throat, abd pain, vomiting, diarrhea, dysuria or other sx. Patient occasionally drinks, but denies tobacco use. She is currently on lasix (took and extra dose of 40 mg Lasix yesterday without help).     Duration/Onset/Timing: 3 weeks/gradual/constant  Location: none stated  Radiation: none stated  Quality: SOA  Intensity/Severity: moderate  Associated Symptoms: intermittent RLE pain and dry cough  Aggravating or Alleviating Factors: exertion  Previous Episodes: none stated      PAST MEDICAL HISTORY  Active Ambulatory Problems     Diagnosis Date Noted   • Abdominal bloating 03/28/2016   • Left lower quadrant pain 03/28/2016   • Left upper quadrant pain 03/28/2016   • Allergic rhinitis 03/28/2016   • Anemia 03/28/2016   • Ankle joint pain 03/28/2016   • Atrial fibrillation 03/28/2016   • Chronic kidney disease, stage III (moderate) 03/28/2016   • Acute on chronic diastolic congestive heart failure 03/28/2016   • Carpal tunnel syndrome 03/28/2016   • Depression 03/28/2016   • Diarrhea 03/28/2016   • Diverticulosis of large intestine 03/28/2016   • Gastroesophageal reflux disease 03/28/2016   • Essential hypertension 03/28/2016   • Excessive flatus 03/28/2016   • Fatigue 03/28/2016   • Abdominal wall hernia 03/28/2016   • Hyperlipidemia 03/28/2016   • Hypertension 03/28/2016   • Hypokalemia 03/28/2016   • Hypothyroidism 03/28/2016   • Intestinal obstruction 03/28/2016   • Impacted  cerumen 03/28/2016   • Insomnia 03/28/2016   • Iron deficiency 03/28/2016   • Pain in extremity 03/28/2016   • Swelling of limb 03/28/2016   • Osteoarthritis of hand 03/28/2016   • Osteoarthritis of knee 03/28/2016   • Pulmonary hypertension 03/28/2016   • Urinary tract infection 03/28/2016   • Vitamin D deficiency 03/28/2016   • 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   • Left foot pain 09/28/2016   • Medicare annual wellness visit, initial 03/29/2017   • Abnormal Romberg test 03/29/2017   • LINDER (dyspnea on exertion) 06/06/2017   • Fall at home 06/15/2017   • Blunt head trauma 06/15/2017   • DNR (do not resuscitate) 07/03/2017   • Hospital discharge follow-up 07/11/2017   • Atrial flutter 04/03/2018   • Cardiac pacemaker in situ 03/26/2013   • Chronic kidney disease 04/03/2018   • Diastolic heart failure 04/03/2018   • Mitral regurgitation 04/03/2018   • Pulmonary HTN 04/03/2018   • Sick sinus syndrome 04/03/2018   • Thyroid disease 04/03/2018   • Infected wound/ chest wall  carcinoma removal 04/03/2018     Resolved Ambulatory Problems     Diagnosis Date Noted   • No Resolved Ambulatory Problems     Past Medical History:   Diagnosis Date   • Anemia    • Arthritis    • Cancer    • CHF (congestive heart failure)    • Depression    • Diverticulosis    • GERD (gastroesophageal reflux disease)    • Hyperlipidemia    • Hypertension    • Hypothyroidism    • Neuromuscular disorder    • Urinary tract infection        PAST SURGICAL HISTORY  Past Surgical History:   Procedure Laterality Date   • APPENDECTOMY     • HYSTERECTOMY     • PACEMAKER IMPLANTATION         FAMILY HISTORY  Family History   Problem Relation Age of Onset   • Coronary artery disease Other    • Hypertension Other    • Hyperlipidemia Other        SOCIAL HISTORY  Social History     Social History   • Marital status:      Spouse name: N/A   • Number of children: N/A   • Years of education: N/A     Occupational History   • Not on file.     Social History Main Topics   •  Smoking status: Former Smoker   • Smokeless tobacco: Not on file   • Alcohol use No   • Drug use: No   • Sexual activity: Defer     Other Topics Concern   • Not on file     Social History Narrative   • No narrative on file       ALLERGIES  Amlodipine besy-benazepril hcl; Metoclopramide; and Nisoldipine    REVIEW OF SYSTEMS  Review of Systems   Constitutional: Negative.  Negative for chills and fever.   HENT: Negative.  Negative for sore throat.    Eyes: Negative.    Respiratory: Positive for cough (Dry) and shortness of breath.    Cardiovascular: Negative.  Negative for chest pain.   Gastrointestinal: Negative.    Genitourinary: Negative.  Negative for dysuria.   Musculoskeletal: Positive for myalgias (RLE, intermittent). Negative for back pain.   Skin: Negative.  Negative for rash.   Neurological: Negative.  Negative for headaches.   All other systems reviewed and are negative.      PHYSICAL EXAM  ED Triage Vitals   Temp Heart Rate Resp BP SpO2   06/21/18 1552 06/21/18 1552 06/21/18 1552 06/21/18 1552 06/21/18 1552   98.8 °F (37.1 °C) 60 20 172/63 92 %      Temp src Heart Rate Source Patient Position BP Location FiO2 (%)   06/21/18 1552 06/21/18 1552 06/21/18 1621 06/21/18 1621 --   Tympanic Monitor Sitting Right arm        Physical Exam   Constitutional: No distress.   HENT:   Head: Normocephalic and atraumatic.   Mouth/Throat: Oropharynx is clear and moist. Mucous membranes are dry.   Eyes:   Unremarkable   Cardiovascular: Normal rate and regular rhythm.    Sats of 88% on room air   Pulmonary/Chest: No respiratory distress. She has decreased breath sounds (bilaterally). She has rales (in the left base).   Abdominal: Soft. There is no tenderness.   Musculoskeletal: She exhibits no edema or tenderness.   Neurological: She is alert.   Skin: No rash noted.   Nursing note and vitals reviewed.      LAB RESULTS  Lab Results (last 24 hours)     Procedure Component Value Units Date/Time    CBC & Differential [[id number]]  Collected:  06/21/18 1627    Specimen:  Blood Updated:  06/21/18 1644    Narrative:       The following orders were created for panel order CBC & Differential.  Procedure                               Abnormality         Status                     ---------                               -----------         ------                     CBC Auto Differential[[id number]]        Abnormal            Final result                 Please view results for these tests on the individual orders.    Comprehensive Metabolic Panel [[id number]]  (Abnormal) Collected:  06/21/18 1627    Specimen:  Blood Updated:  06/21/18 1707     Glucose 113 (H) mg/dL      BUN 29 (H) mg/dL      Creatinine 1.53 (H) mg/dL      Sodium 141 mmol/L      Potassium 4.0 mmol/L      Chloride 103 mmol/L      CO2 26.3 mmol/L      Calcium 10.1 (H) mg/dL      Total Protein 7.5 g/dL      Albumin 4.20 g/dL      ALT (SGPT) 13 U/L      AST (SGOT) 19 U/L      Alkaline Phosphatase 109 U/L      Total Bilirubin 0.5 mg/dL      eGFR Non African Amer 32 (L) mL/min/1.73      Globulin 3.3 gm/dL      A/G Ratio 1.3 g/dL      BUN/Creatinine Ratio 19.0     Anion Gap 11.7 mmol/L     Narrative:       The MDRD GFR formula is only valid for adults with stable renal function between ages 18 and 70.    BNP [[id number]]  (Abnormal) Collected:  06/21/18 1627    Specimen:  Blood Updated:  06/21/18 1706     proBNP 2,533.0 (H) pg/mL     Narrative:       Among patients with dyspnea, NT-proBNP is highly sensitive for the detection of acute congestive heart failure. In addition NT-proBNP of <300 pg/ml effectively rules out acute congestive heart failure with 99% negative predictive value.    Troponin [[id number]]  (Normal) Collected:  06/21/18 1627    Specimen:  Blood Updated:  06/21/18 1707     Troponin T <0.010 ng/mL     Narrative:       Troponin T Reference Ranges:  Less than 0.03 ng/mL:    Negative for AMI  0.03 to 0.09 ng/mL:      Indeterminant for AMI  Greater than 0.09 ng/mL: Positive for AMI     CBC Auto Differential [[id number]]  (Abnormal) Collected:  06/21/18 1627    Specimen:  Blood Updated:  06/21/18 1644     WBC 10.59 10*3/mm3      RBC 3.32 (L) 10*6/mm3      Hemoglobin 9.1 (L) g/dL      Hematocrit 30.0 (L) %      MCV 90.4 fL      MCH 27.4 pg      MCHC 30.3 (L) g/dL      RDW 15.3 (H) %      RDW-SD 50.5 fl      MPV 10.2 fL      Platelets 243 10*3/mm3      Neutrophil % 83.3 (H) %      Lymphocyte % 6.1 (L) %      Monocyte % 9.0 %      Eosinophil % 1.0 %      Basophil % 0.3 %      Immature Grans % 0.3 %      Neutrophils, Absolute 8.82 (H) 10*3/mm3      Lymphocytes, Absolute 0.65 (L) 10*3/mm3      Monocytes, Absolute 0.95 10*3/mm3      Eosinophils, Absolute 0.11 10*3/mm3      Basophils, Absolute 0.03 10*3/mm3      Immature Grans, Absolute 0.03 10*3/mm3      nRBC 0.0 /100 WBC           I ordered the above labs and reviewed the results    RADIOLOGY  XR Chest 2 View   Final Result   Mild to moderate left basilar   atelectasis/infiltrate/effusion, follow-up recommended. Cardiomegaly and   pulmonary vascular congestion. Tortuous aorta.       This report was finalized on 6/21/2018 5:51 PM by Dr. Sonido Zheng M.D.               I ordered the above noted radiological studies. Interpreted by radiologist.Reviewed by me in PACS.       PROCEDURES  Procedures  EKG           EKG time: 1648  Rhythm/Rate: Dual paced 60  P waves and IA: atrial paced  QRS, axis: ventricular paced   ST and T waves: diffuse T wave flattening     Interpreted Contemporaneously by me, independently viewed  compared to prior 2017, T wave flattening has increased      PROGRESS AND CONSULTS     6:25 PM  Addressed patient's h/o hospitalization for CHF in 06/2018. Informed patient of slightly improved kidney function, however I discussed that she needs to increase treatment with diuretics. CXR indicates CHF. Discussed plan to admit and treat with bumex. Pt understands and agrees with the plan. All questions answered.    6:24 PM  Ordered  bumex for CHF.     6:33 PM  Discussed patient's case with NICHOL Diallo, including concerns regarding patient's history of CHF, symptoms today and pertinent workup indicating renal failure and CHF. He agrees to admit.       MEDICAL DECISION MAKING  Results were reviewed/discussed with the patient and they were also made aware of online access. Pt also made aware that some labs, such as cultures, will not be resulted during ER visit and follow up with PMD is necessary.     MDM  Number of Diagnoses or Management Options     Amount and/or Complexity of Data Reviewed  Clinical lab tests: reviewed and ordered (BNP-2,533.0)  Tests in the radiology section of CPT®: ordered and reviewed (CXR shows mild to moderate left basilar atelectasis/effusion)  Tests in the medicine section of CPT®: ordered and reviewed (EKG - See EKG procedure note\)  Decide to obtain previous medical records or to obtain history from someone other than the patient: yes  Review and summarize past medical records: yes (Pt was hospitalized in 07/2017 with CHF. )  Independent visualization of images, tracings, or specimens: yes           DIAGNOSIS  Final diagnoses:   Acute on chronic congestive heart failure, unspecified congestive heart failure type       DISPOSITION  ADMISSION    Discussed treatment plan and reason for admission with pt/family and admitting physician.  Pt/family voiced understanding of the plan for admission for further testing/treatment as needed.     Latest Documented Vital Signs:  As of 6:35 PM  BP- 167/65 HR- 60 Temp- 98.8 °F (37.1 °C) (Tympanic) O2 sat- 98%    --  Documentation assistance provided by mariela Merida for Dr. Seo.  Information recorded by the mariela was done at my direction and has been verified and validated by me.         Mariposa Merida  06/21/18 [id number]       Tolu Seo MD  06/21/18 [id number]

## 2018-06-21 NOTE — ED NOTES
Report given to next shift nurses-Charisse, RN and Moira, RN     Aracelis Worthington, RN  06/21/18 [id number]

## 2018-06-21 NOTE — ED NOTES
Pt c/o increased SOA with exertion since 5/30/18 but worse since yesterday. Pt has a history of pulmonary hypertension and is on 4 L O2 via nasal at night but Pt has been wearing oxygen during the day for the past 2 days.     Aracelis Worthington RN  06/21/18 2025

## 2018-06-22 PROBLEM — Z00.00 MEDICARE ANNUAL WELLNESS VISIT, INITIAL: Status: RESOLVED | Noted: 2017-03-29 | Resolved: 2018-06-22
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
## 2018-06-22 PROBLEM — Z09 HOSPITAL DISCHARGE FOLLOW-UP: Status: RESOLVED | Noted: 2017-07-11 | Resolved: 2018-06-22

## 2018-06-22 PROBLEM — R29.818 ABNORMAL ROMBERG TEST: Status: RESOLVED | Noted: 2017-03-29 | Resolved: 2018-06-22

## 2018-06-22 PROBLEM — D64.9 ANEMIA: Status: ACTIVE | Noted: 2018-06-22

## 2018-06-22 PROBLEM — L08.9 INFECTED WOUND: Status: RESOLVED | Noted: 2018-04-03 | Resolved: 2018-06-22

## 2018-06-22 PROBLEM — R06.09 DOE (DYSPNEA ON EXERTION): Status: RESOLVED | Noted: 2017-06-06 | Resolved: 2018-06-22

## 2018-06-22 PROBLEM — I50.33 ACUTE ON CHRONIC DIASTOLIC CONGESTIVE HEART FAILURE (HCC): Status: ACTIVE | Noted: 2018-06-21

## 2018-06-22 PROBLEM — T14.8XXA INFECTED WOUND: Status: RESOLVED | Noted: 2018-04-03 | Resolved: 2018-06-22

## 2018-06-22 PROBLEM — S09.8XXA BLUNT HEAD TRAUMA: Status: RESOLVED | Noted: 2017-06-15 | Resolved: 2018-06-22
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
## 2018-06-22 LAB
ALBUMIN SERPL-MCNC: 3.6 G/DL (ref 3.5–5.2)
ALBUMIN/GLOB SERPL: 1.3 G/DL
ALP SERPL-CCNC: 90 U/L (ref 39–117)
ALT SERPL W P-5'-P-CCNC: 11 U/L (ref 1–33)
ANION GAP SERPL CALCULATED.3IONS-SCNC: 12.9 MMOL/L
ARTERIAL PATENCY WRIST A: POSITIVE
AST SERPL-CCNC: 13 U/L (ref 1–32)
ATMOSPHERIC PRESS: 746.7 MMHG
BASE EXCESS BLDA CALC-SCNC: 4.8 MMOL/L (ref 0–2)
BASOPHILS # BLD AUTO: 0.03 10*3/MM3 (ref 0–0.2)
BASOPHILS NFR BLD AUTO: 0.5 % (ref 0–1.5)
BDY SITE: ABNORMAL
BILIRUB SERPL-MCNC: 0.5 MG/DL (ref 0.1–1.2)
BUN BLD-MCNC: 26 MG/DL (ref 8–23)
BUN/CREAT SERPL: 19.5 (ref 7–25)
CALCIUM SPEC-SCNC: 9.5 MG/DL (ref 8.2–9.6)
CHLORIDE SERPL-SCNC: 102 MMOL/L (ref 98–107)
CO2 SERPL-SCNC: 27.1 MMOL/L (ref 22–29)
CREAT BLD-MCNC: 1.33 MG/DL (ref 0.57–1)
DEPRECATED RDW RBC AUTO: 50.4 FL (ref 37–54)
EOSINOPHIL # BLD AUTO: 0.23 10*3/MM3 (ref 0–0.7)
EOSINOPHIL NFR BLD AUTO: 3.5 % (ref 0.3–6.2)
ERYTHROCYTE [DISTWIDTH] IN BLOOD BY AUTOMATED COUNT: 15.1 % (ref 11.7–13)
FOLATE SERPL-MCNC: 6.86 NG/ML (ref 4.78–24.2)
GAS FLOW AIRWAY: 3 LPM
GFR SERPL CREATININE-BSD FRML MDRD: 37 ML/MIN/1.73
GLOBULIN UR ELPH-MCNC: 2.8 GM/DL
GLUCOSE BLD-MCNC: 99 MG/DL (ref 65–99)
HAPTOGLOB SERPL-MCNC: 195 MG/DL (ref 30–200)
HCO3 BLDA-SCNC: 29.3 MMOL/L (ref 22–28)
HCT VFR BLD AUTO: 27.6 % (ref 35.6–45.5)
HGB BLD-MCNC: 8.3 G/DL (ref 11.9–15.5)
IMM GRANULOCYTES # BLD: 0.02 10*3/MM3 (ref 0–0.03)
IMM GRANULOCYTES NFR BLD: 0.3 % (ref 0–0.5)
IRON 24H UR-MRATE: 27 MCG/DL (ref 37–145)
IRON SATN MFR SERPL: 10 % (ref 20–50)
LYMPHOCYTES # BLD AUTO: 0.64 10*3/MM3 (ref 0.9–4.8)
LYMPHOCYTES NFR BLD AUTO: 9.6 % (ref 19.6–45.3)
MAGNESIUM SERPL-MCNC: 2.1 MG/DL (ref 1.7–2.3)
MCH RBC QN AUTO: 27.5 PG (ref 26.9–32)
MCHC RBC AUTO-ENTMCNC: 30.1 G/DL (ref 32.4–36.3)
MCV RBC AUTO: 91.4 FL (ref 80.5–98.2)
MODALITY: ABNORMAL
MONOCYTES # BLD AUTO: 0.7 10*3/MM3 (ref 0.2–1.2)
MONOCYTES NFR BLD AUTO: 10.5 % (ref 5–12)
NEUTROPHILS # BLD AUTO: 5.03 10*3/MM3 (ref 1.9–8.1)
NEUTROPHILS NFR BLD AUTO: 75.6 % (ref 42.7–76)
PCO2 BLDA: 42.8 MM HG (ref 35–45)
PH BLDA: 7.44 PH UNITS (ref 7.35–7.45)
PLATELET # BLD AUTO: 194 10*3/MM3 (ref 140–500)
PMV BLD AUTO: 9.5 FL (ref 6–12)
PO2 BLDA: 76 MM HG (ref 80–100)
POTASSIUM BLD-SCNC: 3.3 MMOL/L (ref 3.5–5.2)
PROT SERPL-MCNC: 6.4 G/DL (ref 6–8.5)
RBC # BLD AUTO: 3.02 10*6/MM3 (ref 3.9–5.2)
SAO2 % BLDCOA: 95.5 % (ref 92–99)
SODIUM BLD-SCNC: 142 MMOL/L (ref 136–145)
TIBC SERPL-MCNC: 271 MCG/DL (ref 298–536)
TOTAL RATE: 16 BREATHS/MINUTE
TRANSFERRIN SERPL-MCNC: 182 MG/DL (ref 200–360)
TSH SERPL DL<=0.05 MIU/L-ACNC: 1.77 MIU/ML (ref 0.27–4.2)
VIT B12 BLD-MCNC: 364 PG/ML (ref 211–946)
WBC NRBC COR # BLD: 6.65 10*3/MM3 (ref 4.5–10.7)

## 2018-06-22 PROCEDURE — 80053 COMPREHEN METABOLIC PANEL: CPT | Performed by: INTERNAL MEDICINE

## 2018-06-22 PROCEDURE — 84443 ASSAY THYROID STIM HORMONE: CPT | Performed by: INTERNAL MEDICINE

## 2018-06-22 PROCEDURE — 99222 1ST HOSP IP/OBS MODERATE 55: CPT | Performed by: INTERNAL MEDICINE

## 2018-06-22 PROCEDURE — 82746 ASSAY OF FOLIC ACID SERUM: CPT | Performed by: INTERNAL MEDICINE

## 2018-06-22 PROCEDURE — 25010000002 FUROSEMIDE PER 20 MG: Performed by: INTERNAL MEDICINE

## 2018-06-22 PROCEDURE — 83540 ASSAY OF IRON: CPT | Performed by: INTERNAL MEDICINE

## 2018-06-22 PROCEDURE — 25010000002 HEPARIN (PORCINE) PER 1000 UNITS: Performed by: INTERNAL MEDICINE

## 2018-06-22 PROCEDURE — 82607 VITAMIN B-12: CPT | Performed by: INTERNAL MEDICINE

## 2018-06-22 PROCEDURE — 82803 BLOOD GASES ANY COMBINATION: CPT

## 2018-06-22 PROCEDURE — 83735 ASSAY OF MAGNESIUM: CPT | Performed by: INTERNAL MEDICINE

## 2018-06-22 PROCEDURE — 83010 ASSAY OF HAPTOGLOBIN QUANT: CPT | Performed by: INTERNAL MEDICINE

## 2018-06-22 PROCEDURE — 36600 WITHDRAWAL OF ARTERIAL BLOOD: CPT

## 2018-06-22 PROCEDURE — 84466 ASSAY OF TRANSFERRIN: CPT | Performed by: INTERNAL MEDICINE

## 2018-06-22 PROCEDURE — 85025 COMPLETE CBC W/AUTO DIFF WBC: CPT | Performed by: INTERNAL MEDICINE

## 2018-06-22 RX ORDER — POTASSIUM CHLORIDE 750 MG/1
40 CAPSULE, EXTENDED RELEASE ORAL ONCE
Status: COMPLETED | OUTPATIENT
Start: 2018-06-22 | End: 2018-06-22

## 2018-06-22 RX ORDER — FUROSEMIDE 10 MG/ML
20 INJECTION INTRAMUSCULAR; INTRAVENOUS 2 TIMES DAILY
Status: DISCONTINUED | OUTPATIENT
Start: 2018-06-22 | End: 2018-06-23 | Stop reason: HOSPADM

## 2018-06-22 RX ORDER — BOSENTAN 125 MG/1
125 TABLET, FILM COATED ORAL EVERY 12 HOURS SCHEDULED
Status: DISCONTINUED | OUTPATIENT
Start: 2018-06-22 | End: 2018-06-23 | Stop reason: HOSPADM

## 2018-06-22 RX ADMIN — AMIODARONE HYDROCHLORIDE 200 MG: 200 TABLET ORAL at 08:43

## 2018-06-22 RX ADMIN — FUROSEMIDE 20 MG: 10 INJECTION, SOLUTION INTRAMUSCULAR; INTRAVENOUS at 19:37

## 2018-06-22 RX ADMIN — POTASSIUM CHLORIDE 20 MEQ: 1.5 POWDER, FOR SOLUTION ORAL at 08:43

## 2018-06-22 RX ADMIN — HEPARIN SODIUM 5000 UNITS: 5000 INJECTION, SOLUTION INTRAVENOUS; SUBCUTANEOUS at 19:36

## 2018-06-22 RX ADMIN — PANTOPRAZOLE SODIUM 40 MG: 40 TABLET, DELAYED RELEASE ORAL at 06:18

## 2018-06-22 RX ADMIN — HYDRALAZINE HYDROCHLORIDE 50 MG: 50 TABLET, FILM COATED ORAL at 08:43

## 2018-06-22 RX ADMIN — LEVOTHYROXINE SODIUM 88 MCG: 88 TABLET ORAL at 08:43

## 2018-06-22 RX ADMIN — HYDRALAZINE HYDROCHLORIDE 50 MG: 50 TABLET, FILM COATED ORAL at 19:36

## 2018-06-22 RX ADMIN — PAROXETINE HYDROCHLORIDE 40 MG: 20 TABLET, FILM COATED ORAL at 08:43

## 2018-06-22 RX ADMIN — LISINOPRIL 10 MG: 10 TABLET ORAL at 08:43

## 2018-06-22 RX ADMIN — POTASSIUM CHLORIDE 40 MEQ: 750 CAPSULE, EXTENDED RELEASE ORAL at 07:22

## 2018-06-22 RX ADMIN — ASPIRIN 81 MG: 81 TABLET, CHEWABLE ORAL at 09:51

## 2018-06-22 RX ADMIN — BUPROPION HYDROCHLORIDE 75 MG: 75 TABLET, FILM COATED ORAL at 08:43

## 2018-06-22 RX ADMIN — BUPROPION HYDROCHLORIDE 75 MG: 75 TABLET, FILM COATED ORAL at 19:36

## 2018-06-22 RX ADMIN — HEPARIN SODIUM 5000 UNITS: 5000 INJECTION, SOLUTION INTRAVENOUS; SUBCUTANEOUS at 08:43

## 2018-06-22 RX ADMIN — BOSENTAN 125 MG: 125 TABLET, FILM COATED ORAL at 19:37

## 2018-06-22 RX ADMIN — HYDRALAZINE HYDROCHLORIDE 50 MG: 50 TABLET, FILM COATED ORAL at 15:49

## 2018-06-22 RX ADMIN — FUROSEMIDE 20 MG: 10 INJECTION, SOLUTION INTRAMUSCULAR; INTRAVENOUS at 12:45

## 2018-06-22 RX ADMIN — FUROSEMIDE 40 MG: 40 TABLET ORAL at 08:43

## 2018-06-22 RX ADMIN — BOSENTAN 125 MG: 125 TABLET, FILM COATED ORAL at 12:45

## 2018-06-22 NOTE — PROGRESS NOTES
Name: Nehemias Lizama ADMIT: 2018   : 1926  PCP: Gurpreet Maya MD    MRN: [id number] LOS: 0 days   AGE/SEX: 89 y.o. female    ROOM: [facility]/   Subjective   Shortness of breath    Brief hospital course since admission:  Atrial fibrillation, not on anticoagulation  Chronic diastolic heart failure  S/p pacemaker  History of pulmonary hypertension  Sleep apnea      I have reviewed past medical history, social hisotory, family history, allergies.  No changes from admission note.      Review of Systems   Constitutional: Positive for fatigue.   Respiratory: Positive for shortness of breath.    Cardiovascular: Negative for chest pain, palpitations and leg swelling.          Objective   Vital Signs  Temp:  [97.5 °F (36.4 °C)-98.9 °F (37.2 °C)] 97.6 °F (36.4 °C)  Heart Rate:  [60] 60  Resp:  [18-20] 18  BP: (138-191)/(59-77) 152/60  SpO2:  [88 %-98 %] 93 %  on  Flow (L/min):  [2-4] 2;   Device (Oxygen Therapy): nasal cannula  Body mass index is 23.59 kg/m².    Intake/Output Summary (Last 24 hours) at 18 1322  Last data filed at 18 1120   Gross per 24 hour   Intake                0 ml   Output              850 ml   Net             -850 ml       Physical Exam   Constitutional: She is oriented to person, place, and time. She appears well-developed and well-nourished.   HENT:   Head: Normocephalic and atraumatic.   Nose: No mucosal edema, rhinorrhea or nasal deformity. No epistaxis.   Mouth/Throat: Oropharynx is clear and moist and mucous membranes are normal.   Eyes: Conjunctivae and EOM are normal. Pupils are equal, round, and reactive to light.   Neck: Trachea normal and normal range of motion. Neck supple. No JVD present. No tracheal deviation present. No thyroid mass present.   Cardiovascular: Normal rate, regular rhythm, normal heart sounds and normal pulses.    Pulmonary/Chest: She has no decreased breath sounds. She has no wheezes.   Abdominal: Soft. Bowel sounds are normal. There is no  hepatosplenomegaly.   Neurological: She is alert and oriented to person, place, and time. She has normal strength and normal reflexes.   Skin: Skin is warm, dry and intact. No cyanosis. Nails show no clubbing.   Psychiatric: She has a normal mood and affect. Her behavior is normal.   Vitals reviewed.      Results Review:      Results from last 7 days  Lab Units 06/22/18  0401 06/21/18  1627   WBC 10*3/mm3 6.65 10.59   HEMOGLOBIN g/dL 8.3* 9.1*   HEMATOCRIT % 27.6* 30.0*   PLATELETS 10*3/mm3 194 243       Results from last 7 days  Lab Units 06/22/18  0401 06/21/18  1627   SODIUM mmol/L 142 141   POTASSIUM mmol/L 3.3* 4.0   CHLORIDE mmol/L 102 103   CO2 mmol/L 27.1 26.3   BUN mg/dL 26* 29*   CREATININE mg/dL 1.33* 1.53*   GLUCOSE mg/dL 99 113*   CALCIUM mg/dL 9.5 10.1*         Hemoglobin A1C:No results found for: HGBA1C  Glucose Range:No results found for: POCGLU      amiodarone 200 mg Oral Daily   aspirin 81 mg Oral Daily   bosentan 125 mg Oral Q12H   buPROPion 75 mg Oral BID   furosemide 20 mg Intravenous BID   heparin (porcine) 5,000 Units Subcutaneous Q12H   hydrALAZINE 50 mg Oral TID   Iloprost 5 mcg Nebulization 6x Daily   levothyroxine 88 mcg Oral Daily   lisinopril 10 mg Oral Q24H   pantoprazole 40 mg Oral QAM   PARoxetine 40 mg Oral Daily   potassium chloride 20 mEq Oral Daily      Diet Regular; Cardiac, Consistent Carbohydrate, Renal  Assessment/Plan       Assessment/Plan      Active Hospital Problems (** Indicates Principal Problem)    Diagnosis Date Noted   • **Acute on chronic diastolic congestive heart failure [I50.33] 06/21/2018   • Anemia [D64.9] 06/22/2018     Priority: High   • Sick sinus syndrome [I49.5] 04/03/2018   • Atrial flutter [I48.92] 04/03/2018   • Chronic kidney disease, stage III (moderate) [N18.3] 03/28/2016   • Hypertension [I10] 03/28/2016   • Pulmonary hypertension [I27.20] 03/28/2016   • Cardiac pacemaker in situ [Z95.0] 03/26/2013      Resolved Hospital Problems    Diagnosis Date  Noted Date Resolved   No resolved problems to display.     Patient is admitted progressive shortness breath.  She has multiple chronic medical problems which is being treated.  However her hemoglobin has dropped significantly.  Previously her hemoglobin was 10.5 range now at 8.3.  We will get an anemia workup.  Get Hemoccult test    Family is requested hospice consult      Selena Valadez MD  [facility]  06/22/18

## 2018-06-22 NOTE — PLAN OF CARE
Problem: Fall Risk (Adult)  Goal: Absence of Fall  Outcome: Ongoing (interventions implemented as appropriate)      Problem: Patient Care Overview  Goal: Plan of Care Review  Outcome: Ongoing (interventions implemented as appropriate)   06/22/18 1701   Coping/Psychosocial   Plan of Care Reviewed With patient   Plan of Care Review   Progress no change   OTHER   Outcome Summary IV lasix started, home meds in room for pulm HTN, stool needed, patient now a no code, hospice consulted and pt to go home with it, vss, will continue to monitor      Goal: Individualization and Mutuality  Outcome: Ongoing (interventions implemented as appropriate)    Goal: Discharge Needs Assessment  Outcome: Ongoing (interventions implemented as appropriate)    Goal: Interprofessional Rounds/Family Conf  Outcome: Ongoing (interventions implemented as appropriate)      Problem: Skin Injury Risk (Adult)  Goal: Skin Health and Integrity  Outcome: Ongoing (interventions implemented as appropriate)      Problem: Fluid Volume Excess (Adult)  Goal: Optimal Fluid Balance  Outcome: Ongoing (interventions implemented as appropriate)      Problem: Cardiac: Heart Failure (Adult)  Goal: Signs and Symptoms of Listed Potential Problems Will be Absent, Minimized or Managed (Cardiac: Heart Failure)  Outcome: Ongoing (interventions implemented as appropriate)

## 2018-06-22 NOTE — H&P
Internal medicine history and physical    INTERNAL MEDICINE   [facility]       Patient Identification:  Name: Nehemias Lizama  Age: 89 y.o.  Sex: female  :  1926  MRN: [id number]                   Primary Care Physician: Gurpreet Maya MD                                   Chief Complaint:  Progressive shortness of breath and weakness worsening in the last 3 weeks but has been a chronic issue for quite some time.    History of Present Illness:   Patient is a 89-year-old female whose battling with various medical issues including the diagnosis of pulmonary hypertension for which she follows with Dr. Navarro.  Patient says that she always has some issues with difficulty breathing but was managing it until 3 weeks ago when he started to decline progressively.  She cannot say why she waited 3 weeks before she come to the hospital or what changed today that made her decide to seek help.  Patient was needing more oxygen during the daytime compared to 3 weeks ago.  She denies any fever or denies any chills denies any chest pain nausea vomiting diarrhea abdominal pain or burning in urination frequency urgency.      Past Medical History:  Past Medical History:   Diagnosis Date   • Anemia    • Arthritis    • Cancer     skin   • CHF (congestive heart failure)    • Depression    • Diverticulosis    • GERD (gastroesophageal reflux disease)    • Hyperlipidemia    • Hypertension    • Hypothyroidism    • Neuromuscular disorder    • Urinary tract infection      Past Surgical History:  Past Surgical History:   Procedure Laterality Date   • APPENDECTOMY     • HYSTERECTOMY     • PACEMAKER IMPLANTATION        Home Meds:  Prescriptions Prior to Admission   Medication Sig Dispense Refill Last Dose   • amiodarone (PACERONE) 200 MG tablet Take 200 mg by mouth Daily.   Taking   • aspirin 81 MG chewable tablet Chew 81 mg Daily.   Taking   • bosentan (TRACLEER) 125 MG tablet Take 125 mg by mouth 2 (Two) Times a Day.    Taking   • buPROPion (WELLBUTRIN) 75 MG tablet Take 1 tablet by mouth 2 (Two) Times a Day. 180 tablet 1    • furosemide (LASIX) 40 MG tablet Take 40 mg by mouth Daily.      • hydrALAZINE (APRESOLINE) 50 MG tablet TAKE 1 TABLET THREE TIMES A  tablet 0    • Iloprost 20 MCG/ML solution Take 20 mcg/mL by nebulization 4 (Four) Times a Day. Earnest MATTHEWS; Patient Sig: Earnest MATTHEWS ; 0; 04-Sep-2013; Active- 4-6 times daily   Taking   • levothyroxine (SYNTHROID, LEVOTHROID) 88 MCG tablet TAKE 1 TABLET DAILY 90 tablet 0    • omeprazole (priLOSEC) 20 MG capsule TAKE 1 CAPSULE DAILY 90 capsule 1    • PARoxetine (PAXIL) 40 MG tablet TAKE 1 TABLET DAILY 90 tablet 1 Taking   • potassium chloride (K-DUR,KLOR-CON) 20 MEQ CR tablet TAKE 1 TABLET DAILY 90 tablet 1 Taking   • quinapril (ACCUPRIL) 10 MG tablet Take 10 mg by mouth 2 (Two) Times a Day.   Taking   • Artificial Saliva (MOUTH KOTE MT) Mouth Kote Mouth/Throat Solution; Patient Sig: Mouth Kote Mouth/Throat Solution USE AS DIRECTED AS A SALIVA SUBSTITUTE; 12; 0; 01-May-2013; Active      • cholecalciferol (VITAMIN D3) 1000 UNITS tablet Take 2,000 Units by mouth Daily.   Not Taking   • digoxin (LANOXIN) 125 MCG tablet TAKE 1 TABLET DAILY 90 tablet 1 Not Taking   • doxycycline (VIBRAMYCIN) 100 MG capsule Take 1 capsule by mouth 2 (Two) Times a Day. 20 capsule 0    • furosemide (LASIX) 20 MG tablet Take 20 mg by mouth Daily.      • furosemide (LASIX) 40 MG tablet TAKE ONE AND ONE-HALF TABLETS DAILY 135 tablet 1    • montelukast (SINGULAIR) 10 MG tablet Take  by mouth daily.   Not Taking     Current Meds:     Current Facility-Administered Medications:   •  acetaminophen (TYLENOL) tablet 650 mg, 650 mg, Oral, Q4H PRN, Artem Valles MD  •  [START ON 6/22/2018] amiodarone (PACERONE) tablet 200 mg, 200 mg, Oral, Daily, Artem Valles MD  •  [START ON 6/22/2018] aspirin chewable tablet 81 mg, 81 mg, Oral, Daily, Artem Valles MD  •  buPROPion (WELLBUTRIN) tablet 75 mg, 75 mg,  Oral, BID, Artem Valles MD  •  [START ON 6/22/2018] furosemide (LASIX) tablet 40 mg, 40 mg, Oral, Daily, Artem Valles MD  •  heparin (porcine) 5000 UNIT/ML injection 5,000 Units, 5,000 Units, Subcutaneous, Q12H, Artem Valles MD  •  hydrALAZINE (APRESOLINE) tablet 50 mg, 50 mg, Oral, TID, Artem Valles MD  •  [START ON 6/22/2018] levothyroxine (SYNTHROID, LEVOTHROID) tablet 88 mcg, 88 mcg, Oral, Daily, Artem Valles MD  •  [START ON 6/22/2018] lisinopril (PRINIVIL,ZESTRIL) tablet 10 mg, 10 mg, Oral, Q24H, Artem Valles MD  •  nitroglycerin (NITROSTAT) SL tablet 0.4 mg, 0.4 mg, Sublingual, Q5 Min PRN, Artem Valles MD  •  ondansetron (ZOFRAN) injection 4 mg, 4 mg, Intravenous, Q6H PRN, Artem Valles MD  •  [START ON 6/22/2018] pantoprazole (PROTONIX) EC tablet 40 mg, 40 mg, Oral, QAM, Artem Valles MD  •  [START ON 6/22/2018] PARoxetine (PAXIL) tablet 40 mg, 40 mg, Oral, Daily, Artem Valles MD  •  [START ON 6/22/2018] potassium chloride (KLOR-CON) packet 20 mEq, 20 mEq, Oral, Daily, Artem Valles MD  •  sodium chloride 0.9 % flush 1-10 mL, 1-10 mL, Intravenous, PRN, Artem Valles MD  •  sodium chloride 0.9 % flush 10 mL, 10 mL, Intravenous, PRN, Triage Protocol Emergency, MD  •  sodium chloride 0.9 % flush 10 mL, 10 mL, Intravenous, PRN, Tolu Seo MD  Allergies:  Allergies   Allergen Reactions   • Amlodipine Besy-Benazepril Hcl    • Metoclopramide    • Nisoldipine      Social History:   Social History   Substance Use Topics   • Smoking status: Former Smoker   • Smokeless tobacco: Not on file   • Alcohol use No      Family History:  Family History   Problem Relation Age of Onset   • Coronary artery disease Other    • Hypertension Other    • Hyperlipidemia Other           Review of Systems  See history of present illness and past medical history.   Constitutional: Negative.  Negative for chills and fever.   HENT: Negative.  Negative for sore throat.    Eyes: Negative.    Respiratory: Positive for cough (Dry) and shortness  "of breath.    Cardiovascular: Negative.  Negative for chest pain.   Gastrointestinal: Negative.    Genitourinary: Negative.  Negative for dysuria.   Musculoskeletal: Positive for myalgias (RLE, intermittent). Negative for back pain.   Skin: Negative.  Negative for rash.   Neurological: Negative.  Negative for headaches.     Vitals:   /77 (BP Location: Right arm, Patient Position: Lying)   Pulse 60   Temp 97.5 °F (36.4 °C) (Oral)   Resp 18   Ht 157.5 cm (62\")   Wt 58.5 kg (129 lb)   SpO2 94%   BMI 23.59 kg/m²   I/O: No intake or output data in the 24 hours ending 06/21/18 2413  Exam:  General Appearance:    Alert, cooperative, no distress, appears Younger than stated age   Head:    Normocephalic, without obvious abnormality, atraumatic   Eyes:    PERRL, conjunctiva/corneas clear, EOM's intact, both eyes   Ears:    Normal external ear canals, both ears   Nose:   Nares normal, septum midline, mucosa normal, no drainage    or sinus tenderness   Throat:   Lips, tongue, gums normal; oral mucosa pink and moist   Neck:   Supple, symmetrical, trachea midline, no adenopathy;     thyroid:  no enlargement/tenderness/nodules; no carotid    bruit or JVD   Back:     Symmetric, no curvature, ROM normal, no CVA tenderness   Lungs:     Bilateral air entry clear to auscultation occasional rhonchi fine crackles at the bases    Chest Wall:    No tenderness or deformity    Heart:    Regular rate and rhythm, S1 and S2 normal, no murmur, rub   or gallop   Abdomen:     Soft, non-tender, bowel sounds active all four quadrants,     no masses, no hepatomegaly, no splenomegaly   Extremities:   Extremities normal, atraumatic, no cyanosis or edema   Pulses:   Pulses palpable in all extremities; symmetric all extremities   Skin:   Chronic changes in the lower extremities noted    Neurologic:   CNII-XII intact, motor strength grossly intact, sensation grossly intact to light touch, no focal deficits noted       Data Review:      I " reviewed the patient's new clinical results.    Results from last 7 days  Lab Units 06/21/18  1627   WBC 10*3/mm3 10.59   HEMOGLOBIN g/dL 9.1*   PLATELETS 10*3/mm3 243       Results from last 7 days  Lab Units 06/21/18  1627   SODIUM mmol/L 141   POTASSIUM mmol/L 4.0   CHLORIDE mmol/L 103   CO2 mmol/L 26.3   BUN mg/dL 29*   CREATININE mg/dL 1.53*   CALCIUM mg/dL 10.1*   GLUCOSE mg/dL 113*     ECG 12 Lead   Final Result   RR Interval= 1000 ms   MN Interval= 154 ms   QRSD Interval= 104 ms   QT Interval= 456 ms   QTc Interval= 456 ms   Heart Rate= 60 ms   P Axis= 0 deg   QRS Axis= -24 deg   T Wave Axis= -45 deg   I: 40 Axis= -10 deg   T: 40 Axis= -26 deg   ST Axis= -89 deg   ATRIAL-VENTRICULAR DUAL-PACED RHYTHM   NO SIGNIFICANT CHANGE FROM PREVIOUS ECG   Electronically Signed by:  Abby Morrow ([facility]) 21-Jun-2018 19:30:14   Date and Time of Study: 2018-06-21 16:48:51        Xr Chest 2 View    Result Date: 6/21/2018  Mild to moderate left basilar atelectasis/infiltrate/effusion, follow-up recommended. Cardiomegaly and pulmonary vascular congestion. Tortuous aorta.  This report was finalized on 6/21/2018 5:51 PM by Dr. Sonido Zheng M.D.          Assessment:  Active Hospital Problems (** Indicates Principal Problem)    Diagnosis Date Noted   • **Acute on chronic congestive heart failure [I50.9] 06/21/2018   • Diastolic heart failure [I50.30] 04/03/2018   • Sick sinus syndrome [I49.5] 04/03/2018   • Thyroid disease [E07.9] 04/03/2018   • Chronic kidney disease, stage III (moderate) [N18.3] 03/28/2016   • Hypertension [I10] 03/28/2016   • Atrial fibrillation [I48.91] 03/28/2016   • Pulmonary hypertension [I27.20] 03/28/2016   • Cardiac pacemaker in situ [Z95.0] 03/26/2013      Resolved Hospital Problems    Diagnosis Date Noted Date Resolved   No resolved problems to display.       Plan:  Exacerbation of congestive heart failure with element of both systolic and diastolic dysfunction-IV diuretics cardiology  evaluation.  History of pulmonary hypertension-continue her home medications and get pulmonary evaluation.  Chronic atrial fibrillation with history of permanent pacemaker placement-plan is to continue current regimen.  Cardiology is on board.  Chronic kidney disease with potential worsening given the need for diuretics.  Plan is to avoid nephrotoxic agent and any obstructive process.  Hypothyroidism-continue home medications.    Artem Valles MD   6/21/2018  9:34 PM  Much of this encounter note is an electronic transcription/translation of spoken language to printed text. The electronic translation of spoken language may permit erroneous, or at times, nonsensical words or phrases to be inadvertently transcribed; Although I have reviewed the note for such errors, some may still exist

## 2018-06-22 NOTE — CONSULTS
Met with patient and her niece to explain hospice services. Patient is eligible for [facility] [place] benefits, and would like to have our services when she returns to [facility]. Please call our office when a discharge date has been established, so that we can [facility]. Thank you for referral and for allowing me to participate in the care of this patient.    Es Menjivar RN  [facility]  [phone]

## 2018-06-22 NOTE — CONSULTS
Date of Hospital Visit: 18  Encounter Provider: Bubba Gooden MD  Place of Service: [facility] CARDIOLOGY  Patient Name: Nehemias Lizama  :1926  Referral Provider: Artem Valles MD    Chief complaint: SOB    History of Present Illness: Ms. Nehemias Lizama is a 89 year old woman who follows with Dr. Sinha.  She has a reported history of paroxysmal atrial flutter (she is not anticoagulated), chronic diastolic CHF, pulmonary hypertension, systemic hypertension, mild aortic stenosis/moderate tricuspid regurgitation, and CKD.     She was hospitalized one year ago for CHF.  An echo showed grade III diastolic dysfunction, a moderately dilated left atrium, mild-to-moderately RV dilation, mild MR, moderate TR, mild AS, and a normal LVEF of 65%.     She presented to the ED with worsening shortness of breath for 3 weeks. She reported that she normally wears supplemental oxygen at night but has been wearing it during the day due to dyspnea. She took an extra dose of 40 mg Lasix yesterday without improvement of symptoms. Upon arrival, her HR was 60 (paced) and her BP was 170/60. She received IV furosemide and has had good urine output with some improvement in symptoms.     She denies chest pain, palpitations, lightheadedness, syncope, orthopnea, or PND.  She has had some mild leg swelling.     Past Medical History:   Diagnosis Date   • Abdominal wall hernia 3/28/2016   • Anemia    • Aortic stenosis     mild, 2017   • Chronic diastolic CHF (congestive heart failure)    • Depression    • Diverticulosis    • GERD (gastroesophageal reflux disease)    • Hyperlipidemia    • Hypertension    • Hypothyroidism    • Iron deficiency 3/28/2016   • Neuromuscular disorder    • LAUREN (obstructive sleep apnea)    • Osteoarthritis    • Pulmonary hypertension    • Sick sinus syndrome     s/p [facility] [place] dual chamber PPM   • Skin cancer    • Urinary tract infection    • Vitamin D deficiency 3/28/2016        Past Surgical History:   Procedure Laterality Date   • APPENDECTOMY     • HYSTERECTOMY     • PACEMAKER IMPLANTATION         Prior to Admission medications    Medication Sig Start Date End Date Taking? Authorizing Provider   amiodarone (PACERONE) 200 MG tablet Take 200 mg by mouth Daily. 3/26/13  Yes Historical Provider, MD   aspirin 81 MG chewable tablet Chew 81 mg Daily.   Yes Historical Provider, MD   bosentan (TRACLEER) 125 MG tablet Take 125 mg by mouth 2 (Two) Times a Day. 6/25/12  Yes Historical Provider, MD   buPROPion (WELLBUTRIN) 75 MG tablet Take 1 tablet by mouth 2 (Two) Times a Day. 4/3/18  Yes Gurpreet Maya MD   furosemide (LASIX) 40 MG tablet Take 40 mg by mouth Daily.   Yes Historical Provider, MD   hydrALAZINE (APRESOLINE) 50 MG tablet TAKE 1 TABLET THREE TIMES A DAY 6/11/18  Yes Gurpreet Maya MD   Iloprost 20 MCG/ML solution Take 20 mcg/mL by nebulization 4 (Four) Times a Day. Marss CASSIE; Patient Sig: Ventavis SOLN ; 0; 04-Sep-2013; Active- 4-6 times daily 9/4/13  Yes Historical Provider, MD   levothyroxine (SYNTHROID, LEVOTHROID) 88 MCG tablet TAKE 1 TABLET DAILY 6/11/18  Yes Gurpreet Maya MD   omeprazole (priLOSEC) 20 MG capsule TAKE 1 CAPSULE DAILY 6/4/18  Yes Gurpreet Maya MD   PARoxetine (PAXIL) 40 MG tablet TAKE 1 TABLET DAILY 1/22/18  Yes Gurpreet Maya MD   potassium chloride (K-DUR,KLOR-CON) 20 MEQ CR tablet TAKE 1 TABLET DAILY 1/8/18  Yes Gurpreet Maya MD   quinapril (ACCUPRIL) 10 MG tablet Take 10 mg by mouth 2 (Two) Times a Day. 4/22/14  Yes Historical Provider, MD   Artificial Saliva (MOUTH KOTE MT) Mouth Kote Mouth/Throat Solution; Patient Sig: Mouth Kote Mouth/Throat Solution USE AS DIRECTED AS A SALIVA SUBSTITUTE; 12; 0; 01-May-2013; Active 5/1/13   Historical Provider, MD   cholecalciferol (VITAMIN D3) 1000 UNITS tablet Take 2,000 Units by mouth Daily.    Historical Provider, MD   digoxin (LANOXIN) 125 MCG tablet TAKE 1 TABLET DAILY 1/8/18    "Gurpreet Maya MD   doxycycline (VIBRAMYCIN) 100 MG capsule Take 1 capsule by mouth 2 (Two) Times a Day. 4/3/18   Gurpreet Maya MD   furosemide (LASIX) 20 MG tablet Take 20 mg by mouth Daily.    Historical Provider, MD   furosemide (LASIX) 40 MG tablet TAKE ONE AND ONE-HALF TABLETS DAILY 4/23/18   Gurpreet Maya MD   montelukast (SINGULAIR) 10 MG tablet Take  by mouth daily. 1/24/14   Historical Provider, MD       Social History     Social History   • Marital status:      Spouse name: N/A   • Number of children: N/A   • Years of education: N/A     Occupational History   • Not on file.     Social History Main Topics   • Smoking status: Former Smoker   • Smokeless tobacco: Not on file   • Alcohol use No   • Drug use: No   • Sexual activity: Defer     Other Topics Concern   • Not on file     Social History Narrative   • No narrative on file       Family History   Problem Relation Age of Onset   • Coronary artery disease Other    • Hypertension Other    • Hyperlipidemia Other        Review of Systems   Constitutional: Positive for fatigue.   Respiratory: Positive for shortness of breath.    Cardiovascular: Positive for leg swelling.   Musculoskeletal: Positive for arthralgias.   All other systems reviewed and are negative.       Objective:     Vitals:    06/21/18 1958 06/21/18 2307 06/21/18 2358 06/22/18 0740   BP: 138/77 176/65 153/60 152/60   BP Location: Right arm Right arm  Right arm   Patient Position: Lying Lying  Lying   Pulse: 60 60 60 60   Resp: 18 18  18   Temp: 97.5 °F (36.4 °C) 97.8 °F (36.6 °C)  97.6 °F (36.4 °C)   TempSrc: Oral Oral  Oral   SpO2: 94% 96%  93%   Weight: 58.5 kg (129 lb)      Height: 157.5 cm (62\")        Body mass index is 23.59 kg/m².  Flowsheet Rows      First Filed Value   Admission Height  157.5 cm (62\") Documented at 06/21/2018 1552   Admission Weight  59 kg (130 lb) Documented at 06/21/2018 1552          Physical Exam   Constitutional: She has a sickly appearance. "   HENT:   Head: Normocephalic.   Nose: Nose normal.   Mouth/Throat: Oropharynx is clear and moist.   Eyes: Conjunctivae and EOM are normal. Pupils are equal, round, and reactive to light.   Neck: Normal range of motion. No JVD present.   Cardiovascular: Normal rate, regular rhythm and intact distal pulses.    Murmur heard.   Systolic murmur is present with a grade of 2/6   Pulmonary/Chest: Effort normal and breath sounds normal.   Abdominal: Soft. She exhibits no mass. There is no tenderness.   Musculoskeletal: Normal range of motion. She exhibits no edema.   Neurological: She is alert. No cranial nerve deficit.   Skin: Skin is warm and dry. Bruising noted. No erythema.   Psychiatric: She has a normal mood and affect. Her behavior is normal.   Vitals reviewed.              Lab Review:                  Results from last 7 days  Lab Units 06/22/18  0401   SODIUM mmol/L 142   POTASSIUM mmol/L 3.3*   CHLORIDE mmol/L 102   CO2 mmol/L 27.1   BUN mg/dL 26*   CREATININE mg/dL 1.33*   GLUCOSE mg/dL 99   CALCIUM mg/dL 9.5       Results from last 7 days  Lab Units 06/21/18  1627   TROPONIN T ng/mL <0.010       Results from last 7 days  Lab Units 06/22/18  0401   WBC 10*3/mm3 6.65   HEMOGLOBIN g/dL 8.3*   HEMATOCRIT % 27.6*   PLATELETS 10*3/mm3 194               Results from last 7 days  Lab Units 06/22/18  0401   MAGNESIUM mg/dL 2.1             I personally viewed and interpreted the patient's EKG/Telemetry data    Assessment/Plan:     1.  Acute on chronic diastolic congestive heart failure -- unclear precipitant.  She states she is complaint with her diuretic and denies any sodium indiscretion.  Continue IV diuresis. I don't think a repeat echo is indicated as she had one a year ago.  If her blood pressure doesn't improve with diuresis she may need an adjustment in her medications.  I think her anemia is playing a role as well.  I will interrogate her device to make sure she hasn't had any atrial high rates that may have  caused this.    2.  PHTN -- as per pulmonary; her home medications have been resumed    3.  CKD -- stable    4.  Atrial flutter -- currently a-paced.  Will check her device.  Will defer any anticoagulation decisions to her primary cardiologist ([facility]).    5.  HTN -- as per #1    6.  Anemia -- defer to primary team, but it's significant and I think it's causing her cardiac issues to worsen.     ADD:    No atrial high rates noted on device check.  She has <3 mos battery life and will need to see Dr. Sinha very soon after discharge to arrange generator change.

## 2018-06-22 NOTE — PROGRESS NOTES
Discharge Planning Assessment  [facility]     Patient Name: Nehemias Lizama  MRN: [id number]  Today's Date: 6/22/2018    Admit Date: 6/21/2018          Discharge Needs Assessment     Row Name 06/22/18 1502       Living Environment    Lives With alone   Independant Living at [facility]    Current Living Arrangements independent/assisted living facility    Primary Care Provided by self    Provides Primary Care For no one    Family Caregiver if Needed other relative(s)   nerayne kristie GRIFFINJOHNIE Piper Snow [phone]    Quality of Family Relationships supportive    Able to Return to Prior Arrangements yes       Resource/Environmental Concerns    Resource/Environmental Concerns none    Transportation Concerns car, none       Transition Planning    Patient/Family Anticipates Transition to home    Patient/Family Anticipated Services at Transition none    Transportation Anticipated family or friend will provide       Discharge Needs Assessment    Readmission Within the Last 30 Days no previous admission in last 30 days    Concerns to be Addressed discharge planning    Equipment Currently Used at Home bipap/cpap;oxygen;nebulizer   all from [facility]    Anticipated Changes Related to Illness none    Equipment Needed After Discharge oxygen   may need continuous O2    Outpatient/Agency/Support Group Needs homecare agency   has used [facility] in the past            Discharge Plan     Row Name 06/22/18 1505       Plan    Plan Return to Independant Living at [facility] if Home Health needed would use [facility]. If needs O2 continuous will need new order    Patient/Family in Agreement with Plan yes    Plan Comments IMM given and explained. Met at bedside with pt who states she lives in independent living at [facility]. Pt has CPAP, nocturnal O2, and nebulizer from [facility]. Pt states she has used [facility] in the past and would use again if needed. She does not feel she will need home health. Discussed possible  rehab and pt states she has never been and will not go to rehab. Pt rashawn and GABYA is at bedside Vickie [phone] document is on file. Pt PCP is Gurpreet Maya and pharmacy is [facility]  [address]. Plan at this time is to return [place] [facility]. CCP will follow.. If pt needs continuous O2 at DC will need new order and qualifier..drc        Destination     No service coordination in this encounter.      Durable Medical Equipment     No service coordination in this encounter.      Dialysis/Infusion     No service coordination in this encounter.      Home Medical Care     No service coordination in this encounter.      Social Care     No service coordination in this encounter.                Demographic Summary     Row Name 06/22/18 5686       General Information    Admission Type inpatient    Arrived From home;emergency department    Required Notices Provided Important Message from Medicare    Referral Source admission list    Reason for Consult discharge planning    Preferred Language English       Contact Information    Permission Granted to Share Info With power of  for healthcare   sofia Snow document on file            Functional Status     Row Name 06/22/18 1502       Functional Status    Usual Activity Tolerance good    Current Activity Tolerance good       Functional Status, IADL    Medications independent    Meal Preparation assistive person    Housekeeping assistive person    Laundry assistive person    Shopping assistive person       Mental Status    General Appearance WDL WDL       Mental Status Summary    Recent Changes in Mental Status/Cognitive Functioning no changes            Psychosocial    No documentation.           Abuse/Neglect    No documentation.           Legal    No documentation.           Substance Abuse    No documentation.           Patient Forms    No documentation.         Lucy Hollis RN

## 2018-06-22 NOTE — CONSULTS
[facility]  Phone: [phone]  Amor Bernal MD      Subjective   LOS: 0 days     Thank you for this consultation.  89-year-old female who has chronic hypertension and follows with an outside physician.  She is on Ventavis and Tracleer.  She also has obstructive sleep apnea and uses a CPAP machine.  She uses oxygen through the CPAP at night.  Recently she has been using oxygen during the day also.  She came in with shortness of breath for the past 3 weeks.  This has been progressive.  She also has some baseline shortness of breath.  She denies any associated symptoms such as cough or phlegm.  She denies any chest pain.  She has never been a smoker.  In addition to above she also has congestive heart failure of diastolic variety.she has chronic kidney disease level III. she is status post a pacemaker in the past.  Nehemias Lizama  reports that she does not drink alcohol.,  reports that she has quit smoking. She does not have any smokeless tobacco history on file.       Past Hx:  has a past medical history of Anemia; Arthritis; Cancer; CHF (congestive heart failure); Depression; Diverticulosis; GERD (gastroesophageal reflux disease); Hyperlipidemia; Hypertension; Hypothyroidism; Neuromuscular disorder; and Urinary tract infection.  Surg Hx:  has a past surgical history that includes Pacemaker Implantation; Appendectomy; and Hysterectomy.  FH: family history includes Coronary artery disease in her other; Hyperlipidemia in her other; Hypertension in her other.  SH:  reports that she has quit smoking. She does not have any smokeless tobacco history on file. She reports that she does not drink alcohol or use drugs.    Prescriptions Prior to Admission   Medication Sig Dispense Refill Last Dose   • amiodarone (PACERONE) 200 MG tablet Take 200 mg by mouth Daily.   Taking   • aspirin 81 MG chewable tablet Chew 81 mg Daily.   Taking   • bosentan (TRACLEER) 125 MG tablet Take 125 mg by mouth 2 (Two) Times a  Day.   Taking   • buPROPion (WELLBUTRIN) 75 MG tablet Take 1 tablet by mouth 2 (Two) Times a Day. 180 tablet 1    • furosemide (LASIX) 40 MG tablet Take 40 mg by mouth Daily.      • hydrALAZINE (APRESOLINE) 50 MG tablet TAKE 1 TABLET THREE TIMES A  tablet 0    • Iloprost 20 MCG/ML solution Take 20 mcg/mL by nebulization 4 (Four) Times a Day. Earnest MATTHEWS; Patient Sig: Earnest MATTHEWS ; 0; 04-Sep-2013; Active- 4-6 times daily   Taking   • levothyroxine (SYNTHROID, LEVOTHROID) 88 MCG tablet TAKE 1 TABLET DAILY 90 tablet 0    • omeprazole (priLOSEC) 20 MG capsule TAKE 1 CAPSULE DAILY 90 capsule 1    • PARoxetine (PAXIL) 40 MG tablet TAKE 1 TABLET DAILY 90 tablet 1 Taking   • potassium chloride (K-DUR,KLOR-CON) 20 MEQ CR tablet TAKE 1 TABLET DAILY 90 tablet 1 Taking   • quinapril (ACCUPRIL) 10 MG tablet Take 10 mg by mouth 2 (Two) Times a Day.   Taking   • Artificial Saliva (MOUTH KOTE MT) Mouth Kote Mouth/Throat Solution; Patient Sig: Mouth Kote Mouth/Throat Solution USE AS DIRECTED AS A SALIVA SUBSTITUTE; 12; 0; 01-May-2013; Active      • cholecalciferol (VITAMIN D3) 1000 UNITS tablet Take 2,000 Units by mouth Daily.   Not Taking   • digoxin (LANOXIN) 125 MCG tablet TAKE 1 TABLET DAILY 90 tablet 1 Not Taking   • doxycycline (VIBRAMYCIN) 100 MG capsule Take 1 capsule by mouth 2 (Two) Times a Day. 20 capsule 0    • furosemide (LASIX) 20 MG tablet Take 20 mg by mouth Daily.      • furosemide (LASIX) 40 MG tablet TAKE ONE AND ONE-HALF TABLETS DAILY 135 tablet 1    • montelukast (SINGULAIR) 10 MG tablet Take  by mouth daily.   Not Taking     Allergies   Allergen Reactions   • Amlodipine Besy-Benazepril Hcl    • Metoclopramide    • Nisoldipine        Review of Systems   Constitutional: Negative for chills and fever.   HENT: Negative for congestion, postnasal drip and trouble swallowing.    Respiratory: Positive for shortness of breath. Negative for cough and wheezing.    Cardiovascular: Negative for chest pain and leg  swelling.   Gastrointestinal: Negative for abdominal pain, diarrhea, nausea and vomiting.   Endocrine: Negative for cold intolerance and heat intolerance.   Genitourinary: Negative for frequency and urgency.   Musculoskeletal: Negative for arthralgias and back pain.   Skin: Negative for pallor and rash.   Neurological: Negative for seizures and headaches.   Psychiatric/Behavioral: Negative for confusion. The patient is not nervous/anxious.      Vital Signs past 24hrs  BP range: BP: (138-191)/(59-77) 153/60  Pulse range: Heart Rate:  [60] 60  Resp rate range: Resp:  [18-20] 18  Temp range: Temp (24hrs), Av.3 °F (36.8 °C), Min:97.5 °F (36.4 °C), Max:98.9 °F (37.2 °C)    Oxygen range: SpO2:  [88 %-98 %] 96 %; Flow (L/min):  [2-4] 2;   Device (Oxygen Therapy): nasal cannula  58.5 kg (129 lb); Body mass index is 23.59 kg/m².  I/O this shift:  In: -   Out: 400 [Urine:400]    Adult female laying in bed.  She is somewhat forgetful but I did take her out of a deep sleep.  She is currently on low flow nasal cannula.  Pupils equal and react to light.  Oropharynx class IV Mallampati airway and very dry oropharynx.  Nasopharynx without discharge septum midline.  JVP not elevated trachea midline thyroid not enlarged.  Lungs reveal bilateral air entry clear to auscultation no rales rhonchi or wheeze.  Percussion note resonant.  Chest expansion equal no chest wall deformities or tenderness.  Heart examination S1-S2 present rhythm irregular with no murmurs noted by me.  No edema lower extremities.  Abdomen is soft nontender bowel sounds present no liver spleen enlargement.  No peripheral cyanosis clubbing.  She is moving all 4 extremities but appears somewhat forgetful.  No cervical, axillary, inguinal adenopathy.    Results Review:    I have reviewed the laboratory and imaging data from current admission. My annotations are as noted in assessment and plan.    Medication Review:  I have reviewed the current MAR. My annotations  are as noted in assessment and plan.    Plan   PCCM Problems  PHT on Ventavis and Tracleer  LAUREN, compliant CPAP  Decompensated CHF  Nocturnal hypoxia, on O2 with CPAP  Relevant Medical Diagnoses  CKD 3  CAD  Afib    Plan of Treatment  Patient had underlying pulmonary hypertension.  She needs to continue her home medications to prevent a rebound increase in upon a hypertension.  I instructed the RN.  Her medications are down in the pharmacy and she will receive them today.    She has obstructive sleep apnea is compliant with a CPAP machine.  She'll be encouraged to continue to use it at night to reduce her risk of obstructive sleep apnea.    I have reviewed her chest x-ray.  I do not believe she has a new infection.  This seems to be a small amount of fluid in the left lung base.  Difficult to ascertain because of multiple shadows.  A repeat chest x-ray should be obtained tomorrow.  I would continue to hold off antibiotics as you are doing.    She uses oxygen at night through a CPAP circuit.  Lately she has been using during the day as well.  This might indicate worsening dyspnea.  Note that diuretics have been started.  She has had an improvement in her creatinine already.  I would continue to pursue the same course.    Her hemoglobin has taken a drop despite diuretics.  Unclear etiology.  Defer to hospitalist for further workup.  Certainly low hemoglobin can contribute to some dyspnea as well.    In view of her new symptoms, obtain ABG on RA.    Part of this note may be an electronic transcription/translation of spoken language to printed text using the Dragon Dictation System.

## 2018-06-22 NOTE — PLAN OF CARE
Problem: Fall Risk (Adult)  Goal: Identify Related Risk Factors and Signs and Symptoms  Outcome: Outcome(s) achieved Date Met: 06/22/18    Goal: Absence of Fall  Outcome: Ongoing (interventions implemented as appropriate)      Problem: Patient Care Overview  Goal: Plan of Care Review  Outcome: Ongoing (interventions implemented as appropriate)   06/22/18 0520   Coping/Psychosocial   Plan of Care Reviewed With patient   Plan of Care Review   Progress no change   OTHER   Outcome Summary Pt to 4E w CHF exacerbation. 2L O2. Cardio & Pulmonary consulted. VSS, will continue to monitor.      Goal: Individualization and Mutuality  Outcome: Ongoing (interventions implemented as appropriate)    Goal: Discharge Needs Assessment  Outcome: Ongoing (interventions implemented as appropriate)    Goal: Interprofessional Rounds/Family Conf  Outcome: Ongoing (interventions implemented as appropriate)      Problem: Skin Injury Risk (Adult)  Goal: Identify Related Risk Factors and Signs and Symptoms  Outcome: Outcome(s) achieved Date Met: 06/22/18    Goal: Skin Health and Integrity  Outcome: Ongoing (interventions implemented as appropriate)      Problem: Fluid Volume Excess (Adult)  Goal: Identify Related Risk Factors and Signs and Symptoms  Outcome: Outcome(s) achieved Date Met: 06/22/18    Goal: Optimal Fluid Balance  Outcome: Ongoing (interventions implemented as appropriate)      Problem: Cardiac: Heart Failure (Adult)  Goal: Signs and Symptoms of Listed Potential Problems Will be Absent, Minimized or Managed (Cardiac: Heart Failure)  Outcome: Ongoing (interventions implemented as appropriate)

## 2018-06-23 VITALS
OXYGEN SATURATION: 98 % | BODY MASS INDEX: 22.82 KG/M2 | DIASTOLIC BLOOD PRESSURE: 61 MMHG | TEMPERATURE: 98.2 F | RESPIRATION RATE: 18 BRPM | WEIGHT: 124 LBS | SYSTOLIC BLOOD PRESSURE: 162 MMHG | HEART RATE: 60 BPM | HEIGHT: 62 IN

## 2018-06-23 PROBLEM — D50.9 IRON DEFICIENCY ANEMIA: Status: ACTIVE | Noted: 2018-06-23

## 2018-06-23 PROBLEM — J96.11 CHRONIC RESPIRATORY FAILURE WITH HYPOXIA (HCC): Status: ACTIVE | Noted: 2018-06-23

## 2018-06-23 LAB
ANION GAP SERPL CALCULATED.3IONS-SCNC: 12.5 MMOL/L
BUN BLD-MCNC: 30 MG/DL (ref 8–23)
BUN/CREAT SERPL: 16.9 (ref 7–25)
CALCIUM SPEC-SCNC: 9.8 MG/DL (ref 8.2–9.6)
CHLORIDE SERPL-SCNC: 101 MMOL/L (ref 98–107)
CO2 SERPL-SCNC: 27.5 MMOL/L (ref 22–29)
CREAT BLD-MCNC: 1.78 MG/DL (ref 0.57–1)
DEPRECATED RDW RBC AUTO: 51.6 FL (ref 37–54)
ERYTHROCYTE [DISTWIDTH] IN BLOOD BY AUTOMATED COUNT: 15.4 % (ref 11.7–13)
GFR SERPL CREATININE-BSD FRML MDRD: 27 ML/MIN/1.73
GLUCOSE BLD-MCNC: 95 MG/DL (ref 65–99)
HCT VFR BLD AUTO: 33.1 % (ref 35.6–45.5)
HGB BLD-MCNC: 10.2 G/DL (ref 11.9–15.5)
MCH RBC QN AUTO: 28.1 PG (ref 26.9–32)
MCHC RBC AUTO-ENTMCNC: 30.8 G/DL (ref 32.4–36.3)
MCV RBC AUTO: 91.2 FL (ref 80.5–98.2)
PLATELET # BLD AUTO: 331 10*3/MM3 (ref 140–500)
PMV BLD AUTO: 9.7 FL (ref 6–12)
POTASSIUM BLD-SCNC: 3.8 MMOL/L (ref 3.5–5.2)
RBC # BLD AUTO: 3.63 10*6/MM3 (ref 3.9–5.2)
SODIUM BLD-SCNC: 141 MMOL/L (ref 136–145)
WBC NRBC COR # BLD: 11.24 10*3/MM3 (ref 4.5–10.7)

## 2018-06-23 PROCEDURE — 25010000002 HEPARIN (PORCINE) PER 1000 UNITS: Performed by: INTERNAL MEDICINE

## 2018-06-23 PROCEDURE — 25010000002 IRON SUCROSE PER 1 MG: Performed by: INTERNAL MEDICINE

## 2018-06-23 PROCEDURE — 25010000002 FUROSEMIDE PER 20 MG: Performed by: INTERNAL MEDICINE

## 2018-06-23 PROCEDURE — 80048 BASIC METABOLIC PNL TOTAL CA: CPT | Performed by: INTERNAL MEDICINE

## 2018-06-23 PROCEDURE — 85027 COMPLETE CBC AUTOMATED: CPT | Performed by: INTERNAL MEDICINE

## 2018-06-23 PROCEDURE — 94618 PULMONARY STRESS TESTING: CPT

## 2018-06-23 RX ORDER — FERROUS SULFATE 325(65) MG
325 TABLET ORAL
Qty: 90 TABLET | Refills: 0 | Status: SHIPPED | OUTPATIENT
Start: 2018-06-24 | End: 2018-06-23

## 2018-06-23 RX ORDER — FERROUS SULFATE 325(65) MG
325 TABLET ORAL
Status: DISCONTINUED | OUTPATIENT
Start: 2018-06-24 | End: 2018-06-23 | Stop reason: HOSPADM

## 2018-06-23 RX ORDER — FERROUS SULFATE 325(65) MG
325 TABLET ORAL
Qty: 90 TABLET | Refills: 0 | Status: SHIPPED | OUTPATIENT
Start: 2018-06-24

## 2018-06-23 RX ADMIN — LEVOTHYROXINE SODIUM 88 MCG: 88 TABLET ORAL at 09:01

## 2018-06-23 RX ADMIN — HEPARIN SODIUM 5000 UNITS: 5000 INJECTION, SOLUTION INTRAVENOUS; SUBCUTANEOUS at 09:02

## 2018-06-23 RX ADMIN — AMIODARONE HYDROCHLORIDE 200 MG: 200 TABLET ORAL at 09:01

## 2018-06-23 RX ADMIN — BOSENTAN 125 MG: 125 TABLET, FILM COATED ORAL at 09:10

## 2018-06-23 RX ADMIN — HYDRALAZINE HYDROCHLORIDE 50 MG: 50 TABLET, FILM COATED ORAL at 09:01

## 2018-06-23 RX ADMIN — PAROXETINE HYDROCHLORIDE 40 MG: 20 TABLET, FILM COATED ORAL at 09:01

## 2018-06-23 RX ADMIN — IRON SUCROSE 200 MG: 20 INJECTION, SOLUTION INTRAVENOUS at 15:13

## 2018-06-23 RX ADMIN — FUROSEMIDE 20 MG: 10 INJECTION, SOLUTION INTRAMUSCULAR; INTRAVENOUS at 09:02

## 2018-06-23 RX ADMIN — ASPIRIN 81 MG: 81 TABLET, CHEWABLE ORAL at 09:01

## 2018-06-23 RX ADMIN — PANTOPRAZOLE SODIUM 40 MG: 40 TABLET, DELAYED RELEASE ORAL at 06:19

## 2018-06-23 RX ADMIN — LISINOPRIL 10 MG: 10 TABLET ORAL at 09:02

## 2018-06-23 RX ADMIN — BUPROPION HYDROCHLORIDE 75 MG: 75 TABLET, FILM COATED ORAL at 09:01

## 2018-06-23 RX ADMIN — POTASSIUM CHLORIDE 20 MEQ: 1.5 POWDER, FOR SOLUTION ORAL at 09:02

## 2018-06-23 RX ADMIN — HYDRALAZINE HYDROCHLORIDE 50 MG: 50 TABLET, FILM COATED ORAL at 16:02

## 2018-06-23 NOTE — PROGRESS NOTES
Dr. TOM Marrufo    [address]    6/23/2018    Patient ID:  Name:  Nehemias Lizama  MRN:  [id number]  6/17/1926  89 y.o.  female            CC/Reason for visit: Follow-up for pulmonary hypertension, LAUREN, decompensated CHF,      HPI: Patient denies any shortness of breath at rest.  When she ambulates she does become short of breath.  Her ambulatory oximetry today on room air showed oxygen desaturation down to 84%.  She denies cough, fever or chest pain.  No syncope    Vitals:  Vitals:    06/23/18 1322 06/23/18 1330 06/23/18 1337 06/23/18 1338   BP:       BP Location:       Patient Position:       Pulse:       Resp:       Temp:       TempSrc:       SpO2: 100% 94% (!) 84% 91%   Weight:       Height:               Body mass index is 22.68 kg/m².    Intake/Output Summary (Last 24 hours) at 06/23/18 1406  Last data filed at 06/23/18 0950   Gross per 24 hour   Intake              240 ml   Output             1925 ml   Net            -1685 ml       Exam:  GEN:  No distress, Awake and alert  LUNGS: Clear breath sounds bilat, nonlabored breathing  CV:  Normal S1S2, without murmur, no edema  ABD:  Non tender, no enlarged liver or masses  EXT:  No cyanosis or clubbing    Scheduled meds:    amiodarone 200 mg Oral Daily   aspirin 81 mg Oral Daily   bosentan 125 mg Oral Q12H   buPROPion 75 mg Oral BID   furosemide 20 mg Intravenous BID   heparin (porcine) 5,000 Units Subcutaneous Q12H   hydrALAZINE 50 mg Oral TID   Iloprost 5 mcg Nebulization 6x Daily   iron sucrose 200 mg Intravenous Once   levothyroxine 88 mcg Oral Daily   lisinopril 10 mg Oral Q24H   pantoprazole 40 mg Oral QAM   PARoxetine 40 mg Oral Daily   potassium chloride 20 mEq Oral Daily     IV meds:                           Data Review:   I reviewed the patient's medications and new clinical results.  Lab Results   Component Value Date    CALCIUM 9.8 (H) 06/23/2018    PHOS 3.6 07/04/2017    MG 2.1 06/22/2018       Results from last 7 days  Lab Units  06/23/18  1319 06/23/18  0455 06/22/18  0401 06/21/18  1627   SODIUM mmol/L  --  141 142 141   POTASSIUM mmol/L  --  3.8 3.3* 4.0   CHLORIDE mmol/L  --  101 102 103   CO2 mmol/L  --  27.5 27.1 26.3   BUN mg/dL  --  30* 26* 29*   CREATININE mg/dL  --  1.78* 1.33* 1.53*   CALCIUM mg/dL  --  9.8* 9.5 10.1*   BILIRUBIN mg/dL  --   --  0.5 0.5   ALK PHOS U/L  --   --  90 109   ALT (SGPT) U/L  --   --  11 13   AST (SGOT) U/L  --   --  13 19   GLUCOSE mg/dL  --  95 99 113*   WBC 10*3/mm3 11.24*  --  6.65 10.59   HEMOGLOBIN g/dL 10.2*  --  8.3* 9.1*   PLATELETS 10*3/mm3 331  --  194 243   PROBNP pg/mL  --   --   --  2,533.0*                 Results from last 7 days  Lab Units 06/21/18  1627   TROPONIN T ng/mL <0.010       Results from last 7 days  Lab Units 06/22/18  0930   PH, ARTERIAL pH units 7.444   PCO2, ARTERIAL mm Hg 42.8   PO2 ART mm Hg 76.0*   FLOW RATE lpm 3   MODALITY  Cannula   O2 SATURATION CALC % 95.5         ASSESSMENT:   Exertional hypoxemia  Pulmonary hypertension  Small left pleural effusion  LAUREN    Acute on chronic diastolic congestive heart failure    Chronic kidney disease, stage III (moderate)    Hypertension    Atrial flutter    Cardiac pacemaker in situ    Sick sinus syndrome    Anemia      PLAN:  The patient needs oxygen during exertion.  She desaturates to 84% on room air during exertion.  Continue pulmonary hypertension medications which she has been taking for a long time.  She does have a small left pleural effusion, but I don't see the need for thoracentesis.  This can be slowly mobilize with diuretics.  Continue CPAP for LAUREN.  Use oxygen at nighttime via CPAP  Agree with hospice      Chava Marrufo MD  6/23/2018

## 2018-06-23 NOTE — PLAN OF CARE
Problem: Fall Risk (Adult)  Goal: Absence of Fall  Outcome: Ongoing (interventions implemented as appropriate)      Problem: Patient Care Overview  Goal: Plan of Care Review  Outcome: Ongoing (interventions implemented as appropriate)   06/23/18 0411   Coping/Psychosocial   Plan of Care Reviewed With patient   Plan of Care Review   Progress improving   OTHER   Outcome Summary pt is alert and oriented, 2L O2 nasal cannula, no complaints of pain, no falls, up with assist x 1, IV lasix, AV paced, continue to monitor     Goal: Individualization and Mutuality  Outcome: Ongoing (interventions implemented as appropriate)    Goal: Discharge Needs Assessment  Outcome: Ongoing (interventions implemented as appropriate)      Problem: Fluid Volume Excess (Adult)  Goal: Optimal Fluid Balance  Outcome: Ongoing (interventions implemented as appropriate)      Problem: Cardiac: Heart Failure (Adult)  Goal: Signs and Symptoms of Listed Potential Problems Will be Absent, Minimized or Managed (Cardiac: Heart Failure)  Outcome: Ongoing (interventions implemented as appropriate)

## 2018-06-23 NOTE — DISCHARGE SUMMARY
Name: Nehemias Lizama  Age: 89 y.o.  Sex: female  :  1926  MRN: [id number]         Primary Care Physician: Gurpreet Maya MD      Date of Admission:  2018  Date of Discharge:  2018      CHIEF COMPLAINT     Shortness of Breath (on o2 baseline at night - more soa c exertion last 3 weeks)      DISCHARGE DIAGNOSIS  Active Hospital Problems (** Indicates Principal Problem)    Diagnosis Date Noted   • **Acute on chronic diastolic congestive heart failure [I50.33] 2018   • Iron deficiency anemia [D50.9] 2018     Priority: High   • Chronic respiratory failure with hypoxia [J96.11] 2018   • Sick sinus syndrome [I49.5] 2018   • Atrial flutter [I48.92] 2018   • Chronic kidney disease, stage III (moderate) [N18.3] 2016   • Hypertension [I10] 2016   • Pulmonary hypertension [I27.20] 2016   • Cardiac pacemaker in situ [Z95.0] 2013      Resolved Hospital Problems    Diagnosis Date Noted Date Resolved   No resolved problems to display.       SECONDARY DIAGNOSES  Past Medical History:   Diagnosis Date   • Abdominal wall hernia 3/28/2016   • Anemia    • Aortic stenosis     mild, 2017   • Chronic diastolic CHF (congestive heart failure)    • Depression    • Diverticulosis    • GERD (gastroesophageal reflux disease)    • Hyperlipidemia    • Hypertension    • Hypothyroidism    • Iron deficiency 3/28/2016   • Neuromuscular disorder    • LAUREN (obstructive sleep apnea)    • Osteoarthritis    • Pulmonary hypertension    • Sick sinus syndrome     s/p [facility] [place] dual chamber PPM   • Skin cancer    • Urinary tract infection    • Vitamin D deficiency 3/28/2016       CONSULTS   Consulting Physician(s)     Provider Relationship Specialty    Bubba Gooden MD Consulting Physician Cardiology    Gael Lubin III, MD Consulting Physician Cardiology    Selena Valadez MD Consulting Physician Pulmonary Disease    Amor Bernal MD Consulting Physician Pulmonary Disease           PROCEDURES PERFORMED  Iron IV infusion  6 min Ex-oximetry      HOSPITAL COURSE  This is a 89-year-old female with multiple medical problems most effect of chronic has a history of pulmonary hypertension normally sees Dr. Navarro and she is on vasodilator therapy she also has a history of sleep apnea his CPAP on a regular basis.  She was admitted to the hospital with a history of having progressive shortness of breath and weakness.  He also sees Dr. Sinha at [facility] for cardiology she has a history of atrial fibrillation but she is not on anticoagulation and also chronic diastolic heart failure and she has a pacemaker.  She was also seen by cardiology and pulmonary.  She was found to have anemia and further workup led to the diagnosis of iron deficiency anemia.  She has been given IV iron infusion and also will be on supplemental iron.  In addition she was also found to be having hypoxia.  Her room air saturations were down to 82% within 1 minute of walking.  She uses oxygen at 2 L with her CPAP but she needs oxygen on a continuous basis.    During this hospitalization patient and her daughter requested that they want to be evaluated by hospice so that they can follow-up the patient as an outpatient.  She was told that her pacemaker battery life is only about 3 months left and she does not want to change the battery.  The hospice has seen the patient and will follow the patient as an outpatient.      PHYSICAL EXAM  Temp:  [97.8 °F (36.6 °C)-98.6 °F (37 °C)] 98.2 °F (36.8 °C)  Heart Rate:  [60-64] 60  Resp:  [16-18] 18  BP: (129-162)/(53-61) 162/61  Body mass index is 22.68 kg/m².  Physical Exam  HEENT: Unremarkable, pupils are round equal and reacting to light. Sao2  NECK: No lymphadenopathy, throat is clear,   RESPRATORY SYSTEM: Breath sounds are equal on both sides and are normal, no wheezes or crackles  CARDIOVASULAR SYSTEM: Heart rate is regular without murmur  ABDOMEN: Soft, no ascites, no  hepatosplenomegaly.  EXTREMITIES: No cyanosis, clubbing or edema    CONDITION ON DISCHARGE  Stable.      DISCHARGE DISPOSITION   Home      ALLERGIES  Allergies   Allergen Reactions   • Amlodipine Besy-Benazepril Hcl    • Metoclopramide    • Nisoldipine        RECENT LABS    Results from last 7 days  Lab Units 06/23/18  1319 06/22/18  0401 06/21/18  1627   WBC 10*3/mm3 11.24* 6.65 10.59   HEMOGLOBIN g/dL 10.2* 8.3* 9.1*   HEMATOCRIT % 33.1* 27.6* 30.0*   PLATELETS 10*3/mm3 331 194 243       Results from last 7 days  Lab Units 06/23/18  0455 06/22/18  0401 06/21/18  1627   SODIUM mmol/L 141 142 141   POTASSIUM mmol/L 3.8 3.3* 4.0   CHLORIDE mmol/L 101 102 103   CO2 mmol/L 27.5 27.1 26.3   BUN mg/dL 30* 26* 29*   CREATININE mg/dL 1.78* 1.33* 1.53*   GLUCOSE mg/dL 95 99 113*   CALCIUM mg/dL 9.8* 9.5 10.1*                DIET;  Diet Order   Procedures   • Diet Regular; Cardiac, Consistent Carbohydrate, Renal       DISCHARGE MEDICATIONS     Your medication list      START taking these medications      Instructions Last Dose Given Next Dose Due   ferrous sulfate 325 (65 FE) MG tablet  Start taking on:  6/24/2018      Take 1 tablet by mouth Daily With Breakfast.          CHANGE how you take these medications      Instructions Last Dose Given Next Dose Due   furosemide 40 MG tablet  Commonly known as:  LASIX  What changed:  Another medication with the same name was removed. Continue taking this medication, and follow the directions you see here.      TAKE ONE AND ONE-HALF TABLETS DAILY          CONTINUE taking these medications      Instructions Last Dose Given Next Dose Due   amiodarone 200 MG tablet  Commonly known as:  PACERONE      Take 200 mg by mouth Daily.       aspirin 81 MG chewable tablet      Chew 81 mg Daily.       bosentan 125 MG tablet  Commonly known as:  TRACLEER      Take 125 mg by mouth 2 (Two) Times a Day.       buPROPion 75 MG tablet  Commonly known as:  WELLBUTRIN      Take 1 tablet by mouth 2 (Two)  Times a Day.       cholecalciferol 1000 units tablet  Commonly known as:  VITAMIN D3      Take 2,000 Units by mouth Daily.       digoxin 125 MCG tablet  Commonly known as:  LANOXIN      TAKE 1 TABLET DAILY       hydrALAZINE 50 MG tablet  Commonly known as:  APRESOLINE      TAKE 1 TABLET THREE TIMES A DAY       Iloprost 20 MCG/ML solution      Take 5 mcg by nebulization 6 (Six) Times a Day. Ventavis SOLN; Patient Sig: Ventavis SOLN ; 0; 04-Sep-2013; Active- 4-6 times daily       levothyroxine 88 MCG tablet  Commonly known as:  SYNTHROID, LEVOTHROID      TAKE 1 TABLET DAILY       montelukast 10 MG tablet  Commonly known as:  SINGULAIR      Take  by mouth daily.       MOUTH KOTE MT      Mouth Kote Mouth/Throat Solution; Patient Sig: Mouth Kote Mouth/Throat Solution USE AS DIRECTED AS A [facility]; 12; 0; 01-May-2013; Active       omeprazole 20 MG capsule  Commonly known as:  priLOSEC      TAKE 1 CAPSULE DAILY       PARoxetine 40 MG tablet  Commonly known as:  PAXIL      TAKE 1 TABLET DAILY       potassium chloride 20 MEQ CR tablet  Commonly known as:  K-DUR,KLOR-CON      TAKE 1 TABLET DAILY       quinapril 10 MG tablet  Commonly known as:  ACCUPRIL      Take 10 mg by mouth 2 (Two) Times a Day.          STOP taking these medications    doxycycline 100 MG capsule  Commonly known as:  VIBRAMYCIN              Where to Get Your Medications      These medications were sent to [facility]/pharmacy #[id number] [place], [place] - [address] [address] [facility] - [phone]  - [phone]   [address] [address], [place] [address]    Phone:  [phone]   · ferrous sulfate 325 (65 FE) MG tablet          Future Appointments  Date Time Provider Department Center   10/3/2018 1:30 PM Gurpreet Maya MD [id number] None     Follow-up Information     Joseph Sinha MD. Call.    Specialty:  Cardiology  Why:  Please call ASAP as pacemaker battery has <3 months of life  Contact information:  [address]  [address]  [facility] [address]  [phone]             Gurpreet Maya MD Follow up.    Specialty:  Internal Medicine  Contact information:  [address]  [facility] [address]  [facility] [id number] [phone]             [facility] Follow up.    Specialty:  Hospice  Contact information:  [address]  [facility] [address] [phone]                 TEST  RESULTS PENDING AT DISCHARGE  None     CODE STATUS  Code Status and Medical Interventions:   Ordered at: 06/22/18 1349     Limited Support to NOT Include:    NIPPV (Non-Invasive Positive Pressure Support)    Cardioversion/Defibrillation    Intubation    Vasopressors    Antiarrhythmic Drugs    Artificial Nutrition    Dialysis     Level Of Support Discussed With:    Patient     Code Status:    No CPR     Medical Interventions (Level of Support Prior to Arrest):    Limited           Selena Valadez MD  [facility]  06/23/18      Time: greater than 35 minutes.

## 2018-06-23 NOTE — PROGRESS NOTES
Continued Stay Note  [facility]     Patient Name: Nehemias Lizama  MRN: [id number]  Today's Date: 6/23/2018    Admit Date: 6/21/2018          Discharge Plan     Row Name 06/23/18 1427       Plan    Plan Comments CCP called [facility] [phone] @ 2:26 PM to advise Pt is discharging home today per Evangelina, staff RN.  CCP s/w Minerva at [facility] and she verbalized understanding.  MONAE TALLEY/ROCAEL    Row Name 06/23/18 1413       Plan    Plan Comments CCP received call from Evangelina staff RN advising Pt would need continuous oxygen upon d/c.  RN has notified Resp Therapy that a valid walking oximetry is needed.  RN has placed a call to Dr Valadez to advise to put in the oxygen DME order.  Pt currently has nocturnal oxygen provided by [facility].  CCP called [facility] at 1:53 PM and s/w Milton whom is on call and he advised CCP to fax Pt demos to fx: [phone] once info obtained.  CCP is to call Milton at ([phone]) once d/c orders and oxygen order obtained.  CCP following.  SHERLYN NICHOLS RN/ROCAEL              Discharge Codes    No documentation.           Petra Nichols RN

## 2018-06-23 NOTE — PLAN OF CARE
Problem: Fall Risk (Adult)  Goal: Absence of Fall  Outcome: Ongoing (interventions implemented as appropriate)      Problem: Patient Care Overview  Goal: Plan of Care Review  Outcome: Ongoing (interventions implemented as appropriate)   06/23/18 1626   Coping/Psychosocial   Plan of Care Reviewed With patient   Plan of Care Review   Progress improving   OTHER   Outcome Summary VSS. Hgb better and recieved IV Iron Sucrose. Walking oximetry done and pt qualifies for continuous O2. Probable discharge later today.      Goal: Individualization and Mutuality  Outcome: Ongoing (interventions implemented as appropriate)    Goal: Discharge Needs Assessment  Outcome: Ongoing (interventions implemented as appropriate)    Goal: Interprofessional Rounds/Family Conf  Outcome: Ongoing (interventions implemented as appropriate)      Problem: Skin Injury Risk (Adult)  Goal: Skin Health and Integrity  Outcome: Ongoing (interventions implemented as appropriate)      Problem: Fluid Volume Excess (Adult)  Goal: Optimal Fluid Balance  Outcome: Ongoing (interventions implemented as appropriate)      Problem: Cardiac: Heart Failure (Adult)  Goal: Signs and Symptoms of Listed Potential Problems Will be Absent, Minimized or Managed (Cardiac: Heart Failure)  Outcome: Ongoing (interventions implemented as appropriate)

## 2018-06-23 NOTE — PROGRESS NOTES
Continued Stay Note  [facility]     Patient Name: Nehemias Lizama  MRN: [id number]  Today's Date: 6/23/2018    Admit Date: 6/21/2018          Discharge Plan     Row Name 06/23/18 1413       Plan    Plan Comments CCP received call from staff Evangelina RN advising Pt would need continuous oxygen upon d/c.  RN has notified Resp Therapy that a valid walking oximetry is needed.  RN has placed a call to Dr Valadez to advise to put in the oxygen DME order.  Pt currently has nocturnal oxygen provided by [facility].  CCP called [facility] at 1:53 PM and s/w Milton whom is on call and he advised CCP to fax Pt demos to fx: [phone] once info obtained.  CCP is to call Milton at ([phone]) once d/c orders and oxygen order obtained.  CCP following.  SHERLYN NICHOLS RN/CCP              Discharge Codes    No documentation.           Petra Nichols RN

## 2018-06-23 NOTE — PROGRESS NOTES
Exercise Oximetry    Patient Name:Nehemias Lizama   MRN: [id number]   Date: 06/23/18             ROOM AIR BASELINE   SpO2% 94   Heart Rate 60   Blood Pressure      EXERCISE ON ROOM AIR SpO2% EXERCISE ON O2 @ 2  LPM SpO2%   1 MINUTE  82 1 MINUTE  93   2 MINUTES  2 MINUTES 94   3 MINUTES  3 MINUTES 95   4 MINUTES  4 MINUTES 95   5 MINUTES  5 MINUTES 95   6 MINUTES  6 MINUTES 94              Distance Walked   Distance Walked 6 min.   Dyspnea (He Scale)   Dyspnea (He Scale)   Fatigue (He Scale)   Fatigue (He Scale)   SpO2% Post Exercise   SpO2% Post Exercise  96   HR Post Exercise   HR Post Exercise 60   Time to Recovery   Time to Recovery  1 min     Comments: PATIENT AMBULATED AT A BRISK PACE ON ROOM AIR FOR 1 MIN. O2 SATS DECREASEDTO 82%. PUT ON O2 AT 2L, O2 SATS 94% TO 96% ON 2L pATIENT WEARS O2 AT HOME AT NIGHT, CURRENTLY

## 2018-06-23 NOTE — PROGRESS NOTES
Continued Stay Note  [facility]     Patient Name: Nehemias Lizama  MRN: [id number]  Today's Date: 6/23/2018    Admit Date: 6/21/2018          Discharge Plan     Row Name 06/23/18 1702       Plan    Plan Independent Living at [facility].  [place] to provide continuous O2    Plan Comments CCP faxed [facility] PH: [phone] & FX: [phone] Pt f/s, O2 sat qualifier, O2 sat order, d/c summary and progress note at 4:59 PM.  CCP called Milton, on call  on his cell at 4:55 PM to inform his order was not put in EPIC and he could make delivery of portable tank at this time.  Milton verbalized understanding and stated he would head this way in 10 minutes.  [facility] already called to advise [facility] at 2:26 -[phone] that Pt was being d/c.  SHERLYN NICHOLS RN/ROCAEL     Row Name 06/23/18 7033       Plan    Plan Comments CCP called [facility] [phone] @ 2:26 PM to advise Pt is discharging home today per Evangelina, staff RN.  CCP s/w Minerva at [facility] and she verbalized understanding.  MONAE TALLEY/CCP    Row Name 06/23/18 6452       Plan    Plan Comments CCP received call from Evangelina staff RN advising Pt would need continuous oxygen upon d/c.  RN has notified Resp Therapy that a valid walking oximetry is needed.  RN has placed a call to Dr Valadez to advise to put in the oxygen DME order.  Pt currently has nocturnal oxygen provided by [facility].  ROCAEL called [facility] at 1:53 PM and s/w Milton whom is on call and he advised CCP to fax Pt demos to fx: [phone] once info obtained.  CCP is to call Milton at ([phone]) once d/c orders and oxygen order obtained.  CCP following.  SHERLYN NICHOLS RN/CCP              Discharge Codes    No documentation.       Expected Discharge Date and Time     Expected Discharge Date Expected Discharge Time    Jun 23, 2018             Petra Nichols RN

## 2018-06-25 NOTE — PROGRESS NOTES
Case Management Discharge Note    Final Note: Home with continuous O2 thru [facility]    Destination     Service Request Status Selected Specialties Address Phone Number Fax Number    [facility] [place] Accepted N/A [address] , [facility] [address] [phone]        Petra Flores RN 6/23/2018 1707    I.LIVING                 Durable Medical Equipment - Selection Complete     Service Request Status Selected Specialties Address Phone Number Fax Number    [facility] [place] Selected DME Services [id number]  [place] [address], [facility] [address] [phone]      Dialysis/Infusion     No service coordination in this encounter.      Home Medical Care     No service coordination in this encounter.      Social Care     No service coordination in this encounter.        Other:  (car)    Final Discharge Disposition Code: 01 - home or self-care

## 2018-11-02 RX ORDER — BUPROPION HYDROCHLORIDE 75 MG/1
75 TABLET ORAL 2 TIMES DAILY
Qty: 180 TABLET | Refills: 1 | OUTPATIENT
Start: 2018-11-02

## 2019-03-19 ENCOUNTER — TELEPHONE (OUTPATIENT)
Dept: INTERNAL MEDICINE | Facility: CLINIC | Age: 84
End: 2019-03-19

## 2022-11-16 NOTE — PLAN OF CARE
Problem: Patient Care Overview (Adult)  Goal: Plan of Care Review  Outcome: Ongoing (interventions implemented as appropriate)    07/04/17 0334   Coping/Psychosocial Response Interventions   Plan Of Care Reviewed With patient   Patient Care Overview   Progress improving   Outcome Evaluation   Outcome Summary/Follow up Plan Receiving IV Bumex BID, w/decent diuresis. Tolerating ambulating well, w/o SOA. Did not want to wear CPAP overnight as she believes she will not be admitted much longer. Feeling much better, VSS. Will closely monitor.         Problem: Cardiac: Heart Failure (Adult)  Goal: Signs and Symptoms of Listed Potential Problems Will be Absent or Manageable (Cardiac: Heart Failure)  Outcome: Ongoing (interventions implemented as appropriate)    Problem: Fall Risk (Adult)  Goal: Absence of Falls  Outcome: Ongoing (interventions implemented as appropriate)       Rinvoq Pregnancy And Lactation Text: Based on animal studies, Rinvoq may cause embryo-fetal harm when administered to pregnant women.  The medication should not be used in pregnancy.  Breastfeeding is not recommended during treatment and for 6 days after the last dose.